# Patient Record
Sex: MALE | Race: BLACK OR AFRICAN AMERICAN | NOT HISPANIC OR LATINO | Employment: UNEMPLOYED | URBAN - METROPOLITAN AREA
[De-identification: names, ages, dates, MRNs, and addresses within clinical notes are randomized per-mention and may not be internally consistent; named-entity substitution may affect disease eponyms.]

---

## 2023-01-25 PROBLEM — Z98.84 GASTRIC BYPASS STATUS FOR OBESITY: Status: ACTIVE | Noted: 2023-01-25

## 2023-01-25 PROBLEM — I42.9 CARDIOMYOPATHY (MULTI): Status: ACTIVE | Noted: 2023-01-25

## 2023-01-25 PROBLEM — E53.8 VITAMIN B12 DEFICIENCY: Status: ACTIVE | Noted: 2023-01-25

## 2023-01-25 PROBLEM — R06.09 DYSPNEA ON EXERTION: Status: ACTIVE | Noted: 2023-01-25

## 2023-01-25 PROBLEM — D75.89 MACROCYTOSIS: Status: ACTIVE | Noted: 2023-01-25

## 2023-01-25 PROBLEM — K63.8219 SMALL INTESTINAL BACTERIAL OVERGROWTH: Status: ACTIVE | Noted: 2023-01-25

## 2023-01-25 PROBLEM — B35.3 TINEA PEDIS OF RIGHT FOOT: Status: ACTIVE | Noted: 2023-01-25

## 2023-01-25 PROBLEM — M10.9 ACUTE GOUT: Status: ACTIVE | Noted: 2023-01-25

## 2023-01-25 PROBLEM — E79.0 HYPERURICEMIA: Status: ACTIVE | Noted: 2023-01-25

## 2023-01-25 PROBLEM — L98.9 SKIN LESION OF FOOT: Status: ACTIVE | Noted: 2023-01-25

## 2023-01-25 PROBLEM — R14.0 ABDOMINAL BLOATING: Status: ACTIVE | Noted: 2023-01-25

## 2023-01-25 RX ORDER — SPIRONOLACTONE 25 MG/1
0.5 TABLET ORAL DAILY
COMMUNITY
Start: 2019-11-12 | End: 2023-03-09 | Stop reason: SDUPTHER

## 2023-01-25 RX ORDER — FUROSEMIDE 40 MG/1
0.5 TABLET ORAL DAILY
COMMUNITY
Start: 2019-11-12

## 2023-01-25 RX ORDER — COLCHICINE 0.6 MG/1
TABLET ORAL
COMMUNITY
Start: 2022-09-07

## 2023-01-25 RX ORDER — SACUBITRIL AND VALSARTAN 24; 26 MG/1; MG/1
1 TABLET, FILM COATED ORAL 2 TIMES DAILY
COMMUNITY
Start: 2021-04-20

## 2023-01-25 RX ORDER — CARVEDILOL 12.5 MG/1
1 TABLET ORAL
COMMUNITY
Start: 2019-11-12 | End: 2023-09-07 | Stop reason: ALTCHOICE

## 2023-01-25 RX ORDER — FOLIC ACID 1 MG/1
1 TABLET ORAL DAILY
COMMUNITY
Start: 2019-11-15 | End: 2023-03-09 | Stop reason: SDUPTHER

## 2023-01-25 RX ORDER — ALLOPURINOL 300 MG/1
1 TABLET ORAL DAILY
COMMUNITY
Start: 2022-09-10 | End: 2023-04-03 | Stop reason: SDUPTHER

## 2023-03-09 ENCOUNTER — OFFICE VISIT (OUTPATIENT)
Dept: PRIMARY CARE | Facility: CLINIC | Age: 58
End: 2023-03-09
Payer: MEDICARE

## 2023-03-09 VITALS
OXYGEN SATURATION: 96 % | WEIGHT: 234 LBS | DIASTOLIC BLOOD PRESSURE: 63 MMHG | HEART RATE: 77 BPM | TEMPERATURE: 96.2 F | HEIGHT: 61 IN | BODY MASS INDEX: 44.18 KG/M2 | SYSTOLIC BLOOD PRESSURE: 100 MMHG | RESPIRATION RATE: 17 BRPM

## 2023-03-09 DIAGNOSIS — K63.8219 SMALL INTESTINAL BACTERIAL OVERGROWTH: ICD-10-CM

## 2023-03-09 DIAGNOSIS — Z12.5 SCREENING PSA (PROSTATE SPECIFIC ANTIGEN): ICD-10-CM

## 2023-03-09 DIAGNOSIS — D75.89 MACROCYTOSIS: ICD-10-CM

## 2023-03-09 DIAGNOSIS — M10.9 GOUT, UNSPECIFIED CAUSE, UNSPECIFIED CHRONICITY, UNSPECIFIED SITE: ICD-10-CM

## 2023-03-09 DIAGNOSIS — R06.09 DYSPNEA ON EXERTION: ICD-10-CM

## 2023-03-09 DIAGNOSIS — I42.9 CARDIOMYOPATHY, UNSPECIFIED TYPE (MULTI): ICD-10-CM

## 2023-03-09 DIAGNOSIS — H53.8 BLURRY VISION: ICD-10-CM

## 2023-03-09 DIAGNOSIS — Z00.00 MEDICARE ANNUAL WELLNESS VISIT, SUBSEQUENT: Primary | ICD-10-CM

## 2023-03-09 PROBLEM — B35.3 TINEA PEDIS OF RIGHT FOOT: Status: RESOLVED | Noted: 2023-01-25 | Resolved: 2023-03-09

## 2023-03-09 PROBLEM — L98.9 SKIN LESION OF FOOT: Status: RESOLVED | Noted: 2023-01-25 | Resolved: 2023-03-09

## 2023-03-09 PROBLEM — I10 HYPERTENSION: Status: ACTIVE | Noted: 2023-03-09

## 2023-03-09 PROCEDURE — 99213 OFFICE O/P EST LOW 20 MIN: CPT | Performed by: PEDIATRICS

## 2023-03-09 PROCEDURE — 3074F SYST BP LT 130 MM HG: CPT | Performed by: PEDIATRICS

## 2023-03-09 PROCEDURE — 3078F DIAST BP <80 MM HG: CPT | Performed by: PEDIATRICS

## 2023-03-09 PROCEDURE — 82607 VITAMIN B-12: CPT | Performed by: PEDIATRICS

## 2023-03-09 PROCEDURE — 84153 ASSAY OF PSA TOTAL: CPT | Performed by: PEDIATRICS

## 2023-03-09 PROCEDURE — G0439 PPPS, SUBSEQ VISIT: HCPCS | Performed by: PEDIATRICS

## 2023-03-09 PROCEDURE — 85025 COMPLETE CBC W/AUTO DIFF WBC: CPT | Performed by: PEDIATRICS

## 2023-03-09 RX ORDER — FOLIC ACID 1 MG/1
1 TABLET ORAL DAILY
Qty: 30 TABLET | Refills: 11 | Status: SHIPPED | OUTPATIENT
Start: 2023-03-09 | End: 2023-09-07 | Stop reason: SDUPTHER

## 2023-03-09 RX ORDER — CARVEDILOL 6.25 MG/1
6.25 TABLET ORAL
COMMUNITY
Start: 2022-07-01

## 2023-03-09 RX ORDER — SPIRONOLACTONE 25 MG/1
12.5 TABLET ORAL DAILY
Qty: 90 TABLET | Refills: 3 | Status: SHIPPED | OUTPATIENT
Start: 2023-03-09 | End: 2023-09-07 | Stop reason: SINTOL

## 2023-03-09 RX ORDER — INDOMETHACIN 50 MG/1
CAPSULE ORAL
COMMUNITY
Start: 2022-09-07 | End: 2024-03-06 | Stop reason: ALTCHOICE

## 2023-03-09 RX ORDER — NAPROXEN 500 MG/1
TABLET ORAL
COMMUNITY
Start: 2022-10-21

## 2023-03-09 ASSESSMENT — PATIENT HEALTH QUESTIONNAIRE - PHQ9
1. LITTLE INTEREST OR PLEASURE IN DOING THINGS: NOT AT ALL
2. FEELING DOWN, DEPRESSED OR HOPELESS: NOT AT ALL
SUM OF ALL RESPONSES TO PHQ9 QUESTIONS 1 AND 2: 0
SUM OF ALL RESPONSES TO PHQ9 QUESTIONS 1 AND 2: 0
1. LITTLE INTEREST OR PLEASURE IN DOING THINGS: NOT AT ALL
SUM OF ALL RESPONSES TO PHQ9 QUESTIONS 1 AND 2: 0
1. LITTLE INTEREST OR PLEASURE IN DOING THINGS: NOT AT ALL

## 2023-03-09 ASSESSMENT — ACTIVITIES OF DAILY LIVING (ADL)
DRESSING: INDEPENDENT
GROCERY_SHOPPING: INDEPENDENT
TAKING_MEDICATION: INDEPENDENT
DOING_HOUSEWORK: INDEPENDENT
BATHING: INDEPENDENT

## 2023-03-09 ASSESSMENT — PAIN SCALES - GENERAL: PAINLEVEL: 0-NO PAIN

## 2023-03-09 NOTE — PROGRESS NOTES
"Subjective   Patient ID: Olaf Gage is a 58 y.o. male who presents for Blood Pressure Check (Pt. Is here for BP Check follow up.).    HPI   Patient has been feeling well. No new complaints except some blurry vision from time to time; just got over a cold  Review of Systems    Objective   /63 (BP Location: Left arm, Patient Position: Sitting, BP Cuff Size: Adult)   Pulse 77   Temp 35.7 °C (96.2 °F) (Temporal)   Resp 17   Ht 1.549 m (5' 1\")   Wt 106 kg (234 lb)   SpO2 96%   BMI 44.21 kg/m²     Physical Exam  Vitals reviewed.   Constitutional:       General: He is not in acute distress.  HENT:      Head: Normocephalic.      Right Ear: Tympanic membrane normal.      Left Ear: Tympanic membrane normal.      Nose: Nose normal.      Mouth/Throat:      Pharynx: Oropharynx is clear.   Cardiovascular:      Rate and Rhythm: Normal rate and regular rhythm.      Heart sounds: Normal heart sounds.   Pulmonary:      Breath sounds: Wheezing present.      Comments: Occasional wheeze   Abdominal:      Palpations: Abdomen is soft.      Tenderness: There is no abdominal tenderness.   Musculoskeletal:         General: No tenderness.      Cervical back: Neck supple.   Skin:     Findings: No rash.   Neurological:      General: No focal deficit present.      Mental Status: He is alert.   Psychiatric:         Mood and Affect: Mood normal.       Problem List Items Addressed This Visit          Respiratory    Dyspnea on exertion - Primary     Is improved; able to use treadmill            Circulatory    Cardiomyopathy (CMS/HCC)     Patient is under the care of Dr Martinez         Relevant Medications    carvedilol (Coreg) 6.25 mg tablet       Digestive    Small intestinal bacterial overgrowth     Needed antibiotics last fall but has had no recurrence            Assessment/Plan   Problem List Items Addressed This Visit          Respiratory    Dyspnea on exertion - Primary     Is improved; able to use treadmill            " Circulatory    Cardiomyopathy (CMS/HCC)     Patient is under the care of Dr Martinez         Relevant Medications    carvedilol (Coreg) 6.25 mg tablet       Digestive    Small intestinal bacterial overgrowth     Needed antibiotics last fall but has had no recurrence

## 2023-03-13 DIAGNOSIS — I42.9 CARDIOMYOPATHY, UNSPECIFIED TYPE (MULTI): ICD-10-CM

## 2023-03-13 DIAGNOSIS — D75.89 MACROCYTOSIS: Primary | ICD-10-CM

## 2023-04-03 DIAGNOSIS — E79.0 HYPERURICEMIA: Primary | ICD-10-CM

## 2023-04-04 RX ORDER — ALLOPURINOL 300 MG/1
300 TABLET ORAL DAILY
Qty: 90 TABLET | Refills: 1 | Status: SHIPPED | OUTPATIENT
Start: 2023-04-04 | End: 2023-09-21

## 2023-04-18 LAB
BASOPHILS (10*3/UL) IN BLOOD BY AUTOMATED COUNT: 0.02 X10E9/L (ref 0–0.1)
BASOPHILS/100 LEUKOCYTES IN BLOOD BY AUTOMATED COUNT: 0.3 % (ref 0–2)
EOSINOPHILS (10*3/UL) IN BLOOD BY AUTOMATED COUNT: 0.21 X10E9/L (ref 0–0.7)
EOSINOPHILS/100 LEUKOCYTES IN BLOOD BY AUTOMATED COUNT: 3.1 % (ref 0–6)
ERYTHROCYTE DISTRIBUTION WIDTH (RATIO) BY AUTOMATED COUNT: 13.2 % (ref 11.5–14.5)
ERYTHROCYTE MEAN CORPUSCULAR HEMOGLOBIN CONCENTRATION (G/DL) BY AUTOMATED: 33 G/DL (ref 32–36)
ERYTHROCYTE MEAN CORPUSCULAR VOLUME (FL) BY AUTOMATED COUNT: 103 FL (ref 80–100)
ERYTHROCYTES (10*6/UL) IN BLOOD BY AUTOMATED COUNT: 4.04 X10E12/L (ref 4.5–5.9)
HEMATOCRIT (%) IN BLOOD BY AUTOMATED COUNT: 41.8 % (ref 41–52)
HEMOGLOBIN (G/DL) IN BLOOD: 13.8 G/DL (ref 13.5–17.5)
IMMATURE GRANULOCYTES/100 LEUKOCYTES IN BLOOD BY AUTOMATED COUNT: 0.1 % (ref 0–0.9)
LEUKOCYTES (10*3/UL) IN BLOOD BY AUTOMATED COUNT: 6.7 X10E9/L (ref 4.4–11.3)
LYMPHOCYTES (10*3/UL) IN BLOOD BY AUTOMATED COUNT: 2.88 X10E9/L (ref 1.2–4.8)
LYMPHOCYTES/100 LEUKOCYTES IN BLOOD BY AUTOMATED COUNT: 43.1 % (ref 13–44)
MONOCYTES (10*3/UL) IN BLOOD BY AUTOMATED COUNT: 0.54 X10E9/L (ref 0.1–1)
MONOCYTES/100 LEUKOCYTES IN BLOOD BY AUTOMATED COUNT: 8.1 % (ref 2–10)
NEUTROPHILS (10*3/UL) IN BLOOD BY AUTOMATED COUNT: 3.02 X10E9/L (ref 1.2–7.7)
NEUTROPHILS/100 LEUKOCYTES IN BLOOD BY AUTOMATED COUNT: 45.3 % (ref 40–80)
NRBC (PER 100 WBCS) BY AUTOMATED COUNT: 0 /100 WBC (ref 0–0)
PLATELETS (10*3/UL) IN BLOOD AUTOMATED COUNT: 234 X10E9/L (ref 150–450)
THYROTROPIN (MIU/L) IN SER/PLAS BY DETECTION LIMIT <= 0.05 MIU/L: 1.07 MIU/L (ref 0.44–3.98)

## 2023-04-22 DIAGNOSIS — D75.89 MACROCYTOSIS: Primary | ICD-10-CM

## 2023-04-22 NOTE — RESULT ENCOUNTER NOTE
Repeat complete blood count in 3 months and if the MCV is still high, we will refer to hematology specialist. Continue avoiding alcohol as you have done for a month.

## 2023-05-08 ENCOUNTER — TELEPHONE (OUTPATIENT)
Dept: PRIMARY CARE | Facility: CLINIC | Age: 58
End: 2023-05-08
Payer: MEDICARE

## 2023-09-07 ENCOUNTER — OFFICE VISIT (OUTPATIENT)
Dept: PRIMARY CARE | Facility: CLINIC | Age: 58
End: 2023-09-07
Payer: MEDICARE

## 2023-09-07 VITALS
SYSTOLIC BLOOD PRESSURE: 73 MMHG | HEART RATE: 71 BPM | BODY MASS INDEX: 32.99 KG/M2 | OXYGEN SATURATION: 97 % | HEIGHT: 70 IN | TEMPERATURE: 96.8 F | DIASTOLIC BLOOD PRESSURE: 42 MMHG | WEIGHT: 230.4 LBS

## 2023-09-07 DIAGNOSIS — I42.9 CARDIOMYOPATHY, UNSPECIFIED TYPE (MULTI): Primary | ICD-10-CM

## 2023-09-07 DIAGNOSIS — I95.2 HYPOTENSION DUE TO DRUGS: ICD-10-CM

## 2023-09-07 DIAGNOSIS — R14.0 ABDOMINAL BLOATING: ICD-10-CM

## 2023-09-07 DIAGNOSIS — Z98.84 GASTRIC BYPASS STATUS FOR OBESITY: ICD-10-CM

## 2023-09-07 DIAGNOSIS — D75.89 MACROCYTOSIS: ICD-10-CM

## 2023-09-07 DIAGNOSIS — I10 HYPERTENSION, UNSPECIFIED TYPE: ICD-10-CM

## 2023-09-07 DIAGNOSIS — E53.8 VITAMIN B12 DEFICIENCY: ICD-10-CM

## 2023-09-07 DIAGNOSIS — M10.9 ACUTE GOUT INVOLVING TOE, UNSPECIFIED CAUSE, UNSPECIFIED LATERALITY: ICD-10-CM

## 2023-09-07 DIAGNOSIS — E79.0 HYPERURICEMIA: ICD-10-CM

## 2023-09-07 DIAGNOSIS — R06.09 DYSPNEA ON EXERTION: ICD-10-CM

## 2023-09-07 PROBLEM — B35.3 TINEA PEDIS OF RIGHT FOOT: Status: ACTIVE | Noted: 2023-09-07

## 2023-09-07 PROBLEM — I95.9 HYPOTENSION: Status: ACTIVE | Noted: 2023-09-07

## 2023-09-07 PROBLEM — L98.9 SKIN LESION OF FOOT: Status: ACTIVE | Noted: 2023-09-07

## 2023-09-07 PROCEDURE — G0439 PPPS, SUBSEQ VISIT: HCPCS | Performed by: PEDIATRICS

## 2023-09-07 PROCEDURE — 3078F DIAST BP <80 MM HG: CPT | Performed by: PEDIATRICS

## 2023-09-07 PROCEDURE — 3074F SYST BP LT 130 MM HG: CPT | Performed by: PEDIATRICS

## 2023-09-07 PROCEDURE — 99213 OFFICE O/P EST LOW 20 MIN: CPT | Performed by: PEDIATRICS

## 2023-09-07 RX ORDER — AMOXICILLIN AND CLAVULANATE POTASSIUM 875; 125 MG/1; MG/1
1 TABLET, FILM COATED ORAL 2 TIMES DAILY
COMMUNITY
Start: 2023-05-16 | End: 2023-05-26

## 2023-09-07 RX ORDER — LANOLIN ALCOHOL/MO/W.PET/CERES
1000 CREAM (GRAM) TOPICAL DAILY
Qty: 30 TABLET | Refills: 11 | Status: SHIPPED | OUTPATIENT
Start: 2023-09-07 | End: 2024-09-06

## 2023-09-07 RX ORDER — FOLIC ACID 1 MG/1
1 TABLET ORAL DAILY
Qty: 30 TABLET | Refills: 11 | Status: SHIPPED | OUTPATIENT
Start: 2023-09-07

## 2023-09-07 RX ORDER — ACETAMINOPHEN 500 MG
TABLET ORAL
Qty: 1 KIT | Refills: 0 | Status: SHIPPED | OUTPATIENT
Start: 2023-09-07 | End: 2024-03-06

## 2023-09-07 ASSESSMENT — ACTIVITIES OF DAILY LIVING (ADL)
GROCERY_SHOPPING: INDEPENDENT
BATHING: INDEPENDENT
TAKING_MEDICATION: NEEDS ASSISTANCE
MANAGING_FINANCES: INDEPENDENT
DRESSING: INDEPENDENT
DOING_HOUSEWORK: INDEPENDENT

## 2023-09-07 ASSESSMENT — PATIENT HEALTH QUESTIONNAIRE - PHQ9
2. FEELING DOWN, DEPRESSED OR HOPELESS: NOT AT ALL
1. LITTLE INTEREST OR PLEASURE IN DOING THINGS: NOT AT ALL
SUM OF ALL RESPONSES TO PHQ9 QUESTIONS 1 AND 2: 0

## 2023-09-07 NOTE — PROGRESS NOTES
"Subjective   Patient ID: Olaf Gage is a 58 y.o. male who presents for medicare wellness visit    HPI     Patient has noted intermittent dizziness in the last 4 to 6 months when he bends down to tie his shoes; does not check BP at home  PSA normal; cologuard due    Review of Systems    Objective   BP (!) 81/49 (BP Location: Left arm, Patient Position: Sitting)   Pulse 65   Temp 36 °C (96.8 °F)   Ht 1.778 m (5' 10\")   Wt 105 kg (230 lb 6.4 oz)   SpO2 97%   BMI 33.06 kg/m²   BP 73/42 on repeat   Physical Exam  HEENT neg  Lungs clear  Heart reg  Abd soft  Assessment/Plan   Problem List Items Addressed This Visit       Abdominal bloating    Cardiomyopathy (CMS/HCC) - Primary     Dr. Devan Martinez - Cardiologist - Appointment next Wednesday 09/13/2023         Dyspnea on exertion    Gastric bypass status for obesity    Hyperuricemia    Macrocytosis    Relevant Medications    folic acid (Folvite) 1 mg tablet    Vitamin B12 deficiency     B12 supplement daily.          Relevant Medications    cyanocobalamin (Vitamin B-12) 1,000 mcg tablet    Hypertension    Acute gout     Rare flare ups, utilizes colchicine if flare up occurs.          Hypotension     Call placed to Dr Martinez; does not check BP at home; he said to drop Spironolactone; will see dr Martinez next week         Relevant Medications    blood pressure monitor kit     BP dropped to 72/49 briefly; after 911 was called it emmanuel back up to 80s systolic.     "

## 2023-09-07 NOTE — ASSESSMENT & PLAN NOTE
Call placed to Dr Martinez; does not check BP at home; he said to drop Spironolactone; will see dr Martinez next week  Due to BP becoming lower, 911 is called

## 2023-09-21 DIAGNOSIS — E79.0 HYPERURICEMIA: ICD-10-CM

## 2023-09-21 RX ORDER — ALLOPURINOL 300 MG/1
300 TABLET ORAL DAILY
Qty: 90 TABLET | Refills: 1 | Status: SHIPPED | OUTPATIENT
Start: 2023-09-21 | End: 2024-03-23

## 2023-09-27 ENCOUNTER — TELEPHONE (OUTPATIENT)
Dept: PRIMARY CARE | Facility: CLINIC | Age: 58
End: 2023-09-27
Payer: MEDICARE

## 2023-09-27 NOTE — TELEPHONE ENCOUNTER
Pt. Left a voicemail message with his first and last name and mobile  phone number did not leave a detailed message. I called Pt. Back on the number listed unable to leave a voicemail message, voicemail box is full.

## 2023-11-06 ENCOUNTER — TELEPHONE (OUTPATIENT)
Dept: PRIMARY CARE | Facility: CLINIC | Age: 58
End: 2023-11-06

## 2023-12-04 ENCOUNTER — TELEPHONE (OUTPATIENT)
Dept: PRIMARY CARE | Facility: CLINIC | Age: 58
End: 2023-12-04
Payer: MEDICARE

## 2023-12-06 ENCOUNTER — OFFICE VISIT (OUTPATIENT)
Dept: PRIMARY CARE | Facility: CLINIC | Age: 58
End: 2023-12-06
Payer: MEDICARE

## 2023-12-06 VITALS
BODY MASS INDEX: 34.01 KG/M2 | TEMPERATURE: 96.9 F | SYSTOLIC BLOOD PRESSURE: 95 MMHG | WEIGHT: 237 LBS | DIASTOLIC BLOOD PRESSURE: 61 MMHG | HEART RATE: 60 BPM

## 2023-12-06 DIAGNOSIS — I42.9 CARDIOMYOPATHY, UNSPECIFIED TYPE (MULTI): ICD-10-CM

## 2023-12-06 DIAGNOSIS — K04.7 DENTAL INFECTION: Primary | ICD-10-CM

## 2023-12-06 DIAGNOSIS — E53.8 VITAMIN B12 DEFICIENCY: ICD-10-CM

## 2023-12-06 DIAGNOSIS — E79.0 HYPERURICEMIA: ICD-10-CM

## 2023-12-06 DIAGNOSIS — D75.89 MACROCYTOSIS: ICD-10-CM

## 2023-12-06 PROBLEM — M10.9 ACUTE GOUT: Status: RESOLVED | Noted: 2023-09-07 | Resolved: 2023-12-06

## 2023-12-06 PROBLEM — R14.0 ABDOMINAL BLOATING: Status: RESOLVED | Noted: 2023-01-25 | Resolved: 2023-12-06

## 2023-12-06 PROBLEM — K63.8219 SMALL INTESTINAL BACTERIAL OVERGROWTH: Status: RESOLVED | Noted: 2023-01-25 | Resolved: 2023-12-06

## 2023-12-06 PROCEDURE — 3074F SYST BP LT 130 MM HG: CPT | Performed by: PEDIATRICS

## 2023-12-06 PROCEDURE — 90686 IIV4 VACC NO PRSV 0.5 ML IM: CPT | Performed by: PEDIATRICS

## 2023-12-06 PROCEDURE — 99213 OFFICE O/P EST LOW 20 MIN: CPT | Performed by: PEDIATRICS

## 2023-12-06 PROCEDURE — G0008 ADMIN INFLUENZA VIRUS VAC: HCPCS | Performed by: PEDIATRICS

## 2023-12-06 PROCEDURE — 3078F DIAST BP <80 MM HG: CPT | Performed by: PEDIATRICS

## 2023-12-06 RX ORDER — AMOXICILLIN 875 MG/1
875 TABLET, FILM COATED ORAL 2 TIMES DAILY
Qty: 20 TABLET | Refills: 0 | Status: SHIPPED | OUTPATIENT
Start: 2023-12-06 | End: 2023-12-16

## 2023-12-06 NOTE — PROGRESS NOTES
Subjective   Patient ID: Olaf Gage is a 58 y.o. male who presents for pain left jaw    HPI     Headaches, left side jaw pain as well as teeth pain, states teeth are sensitive with cold water. Intermittent sharp pains. Starts at TMJ and goes up left side of head. No numbness, tingling or weakness. Has been ongoing for over 1 month. Was seen in ED last month, CT brain negative but some thickening of ethmoid cells and treated as sinusitis with Augmentin which did relieve symptoms for 1 week.   Has some runny nose, mild cough and left ear fullness but no congestion, post-nasal drip.   Has appt with dentist 12/14        Review of Systems    Objective   BP 95/61   Pulse 60   Temp 36.1 °C (96.9 °F)   Wt 108 kg (237 lb)   BMI 34.01 kg/m²     Physical Exam  Left jaw tender  Tender left mandible  No swollen glands  Assessment/Plan   Problem List Items Addressed This Visit             ICD-10-CM    Cardiomyopathy (CMS/HCC) I42.9     Follows with cardiology         Hyperuricemia E79.0     No attacks with allopurinol         Macrocytosis D75.89     CBC in may showed MCV of 101         Vitamin B12 deficiency E53.8     Cont supplementation         Dental infection - Primary K04.7    Relevant Medications    amoxicillin (Amoxil) 875 mg tablet

## 2023-12-15 ENCOUNTER — TELEPHONE (OUTPATIENT)
Dept: PRIMARY CARE | Facility: CLINIC | Age: 58
End: 2023-12-15
Payer: MEDICARE

## 2024-01-12 ENCOUNTER — TELEPHONE (OUTPATIENT)
Dept: PRIMARY CARE | Facility: CLINIC | Age: 59
End: 2024-01-12
Payer: MEDICARE

## 2024-01-12 NOTE — TELEPHONE ENCOUNTER
Dr. Rosas, Pt left a voicemail message saying his jaw is still hurting he's requesting more amoxicillin. If with questions please contact Pt. at [phone].

## 2024-01-25 ENCOUNTER — TELEPHONE (OUTPATIENT)
Dept: CARDIOLOGY | Facility: CLINIC | Age: 59
End: 2024-01-25
Payer: MEDICARE

## 2024-01-26 ENCOUNTER — TELEPHONE (OUTPATIENT)
Dept: PRIMARY CARE | Facility: CLINIC | Age: 59
End: 2024-01-26
Payer: MEDICARE

## 2024-01-26 NOTE — TELEPHONE ENCOUNTER
, Pt. Left a voicemail message he would like for you to call him back at [phone]. Pt. Did not leave no other details.

## 2024-03-06 ENCOUNTER — OFFICE VISIT (OUTPATIENT)
Dept: CARDIOLOGY | Facility: CLINIC | Age: 59
End: 2024-03-06
Payer: MEDICARE

## 2024-03-06 VITALS
WEIGHT: 232.5 LBS | OXYGEN SATURATION: 98 % | BODY MASS INDEX: 32.55 KG/M2 | HEIGHT: 71 IN | DIASTOLIC BLOOD PRESSURE: 73 MMHG | SYSTOLIC BLOOD PRESSURE: 111 MMHG | HEART RATE: 71 BPM

## 2024-03-06 DIAGNOSIS — I50.9 CONGESTIVE HEART FAILURE, UNSPECIFIED HF CHRONICITY, UNSPECIFIED HEART FAILURE TYPE (MULTI): Primary | ICD-10-CM

## 2024-03-06 DIAGNOSIS — R94.31 ABNORMAL ELECTROCARDIOGRAM (ECG) (EKG): ICD-10-CM

## 2024-03-06 PROCEDURE — 99214 OFFICE O/P EST MOD 30 MIN: CPT | Performed by: NURSE PRACTITIONER

## 2024-03-06 PROCEDURE — 3078F DIAST BP <80 MM HG: CPT | Performed by: NURSE PRACTITIONER

## 2024-03-06 PROCEDURE — 3074F SYST BP LT 130 MM HG: CPT | Performed by: NURSE PRACTITIONER

## 2024-03-06 RX ORDER — TIZANIDINE 4 MG/1
1 TABLET ORAL NIGHTLY
COMMUNITY
Start: 2024-02-08 | End: 2024-03-09

## 2024-03-06 RX ORDER — NAPROXEN SODIUM 220 MG/1
81 TABLET, FILM COATED ORAL
COMMUNITY
Start: 2012-04-20

## 2024-03-06 ASSESSMENT — ENCOUNTER SYMPTOMS
MUSCULOSKELETAL NEGATIVE: 1
CARDIOVASCULAR NEGATIVE: 1
LOSS OF SENSATION IN FEET: 0
NEUROLOGICAL NEGATIVE: 1
DEPRESSION: 0
RESPIRATORY NEGATIVE: 1
GASTROINTESTINAL NEGATIVE: 1
OCCASIONAL FEELINGS OF UNSTEADINESS: 0
CONSTITUTIONAL NEGATIVE: 1

## 2024-03-06 ASSESSMENT — PAIN SCALES - GENERAL: PAINLEVEL: 0-NO PAIN

## 2024-03-06 NOTE — PROGRESS NOTES
"Chief Complaint:   Follow-up    History Of Present Illness:    .Mr Gage returns in follow up.  Denies chest pain, sob, palpitations or pedal edema.  Unfortunately he has a toothache and is working with dental.         Last Recorded Vitals:  Blood pressure 111/73, pulse 71, height 1.803 m (5' 11\"), weight 105 kg (232 lb 8 oz), SpO2 98 %.     Past Medical History:  Past Medical History:   Diagnosis Date    Encounter for immunization 03/08/2022    Encounter for vaccination    Left lower quadrant pain 09/24/2020    Colicky LLQ abdominal pain    Unspecified abdominal pain 03/04/2020    Abdominal pain of multiple sites        Past Surgical History:  History reviewed. No pertinent surgical history.    Social History:  Social History     Socioeconomic History    Marital status:      Spouse name: None    Number of children: None    Years of education: None    Highest education level: None   Occupational History    None   Tobacco Use    Smoking status: Some Days     Types: Cigarettes, Cigars    Smokeless tobacco: Never   Substance and Sexual Activity    Alcohol use: Yes     Alcohol/week: 6.0 standard drinks of alcohol     Types: 6 Glasses of wine per week    Drug use: Never    Sexual activity: None   Other Topics Concern    None   Social History Narrative    None     Social Determinants of Health     Financial Resource Strain: Not on file   Food Insecurity: Not on file   Transportation Needs: Not on file   Physical Activity: Not on file   Stress: Not on file   Social Connections: Not on file   Intimate Partner Violence: Not on file   Housing Stability: Not on file       Family History:  No family history on file.      Allergies:  Patient has no known allergies.    Outpatient Medications:  Current Outpatient Medications   Medication Sig Dispense Refill    allopurinol (Zyloprim) 300 mg tablet TAKE ONE TABLET BY MOUTH EVERY DAY 90 tablet 1    aspirin 81 mg chewable tablet Chew 1 tablet (81 mg) once daily.      " carvedilol (Coreg) 6.25 mg tablet Take 1 tablet (6.25 mg) by mouth 2 times a day with meals.      colchicine 0.6 mg tablet TAKE 2 TABLETS BY MOUTH THEN 1 TABLET IN 1 HOUR. THEN TAKE 1 TABLET DAILY      cyanocobalamin (Vitamin B-12) 1,000 mcg tablet Take 1 tablet (1,000 mcg) by mouth once daily. 30 tablet 11    folic acid (Folvite) 1 mg tablet Take 1 tablet (1 mg) by mouth once daily. 30 tablet 11    furosemide (Lasix) 40 mg tablet 0.5 tablets (20 mg) once daily.      naproxen (Naprosyn) 500 mg tablet Take 1 tablet by mouth twice daily as needed for pain for up to 14 days. Take with food.      sacubitriL-valsartan (Entresto) 24-26 mg tablet Take 1 tablet by mouth 2 times a day.      tiZANidine (Zanaflex) 4 mg tablet Take 1 tablet (4 mg) by mouth once daily at bedtime. PRN       No current facility-administered medications for this visit.        Physical Exam:  Cardiovascular:      PMI at left midclavicular line. Normal rate. Regular rhythm. Normal S1. Normal S2.       Murmurs: There is no murmur.      No gallop.  No click. No rub.   Pulses:     Intact distal pulses.   Edema:     Peripheral edema absent.         ROS:  Review of Systems   Constitutional: Negative.   Cardiovascular: Negative.    Respiratory: Negative.     Skin: Negative.    Musculoskeletal: Negative.    Gastrointestinal: Negative.    Genitourinary: Negative.    Neurological: Negative.           Last Labs:  CBC -  Lab Results   Component Value Date    WBC 6.7 04/18/2023    HGB 13.8 04/18/2023    HCT 41.8 04/18/2023     (H) 04/18/2023     04/18/2023       CMP -  Lab Results   Component Value Date    CALCIUM 8.6 09/09/2022    ALT 13 10/10/2022       LIPID PANEL -   Lab Results   Component Value Date    CHOL 148 09/09/2022    TRIG 164 (H) 09/09/2022    HDL 59.2 09/09/2022    CHHDL 2.5 09/09/2022    LDLF 56 09/09/2022    VLDL 33 09/09/2022       RENAL FUNCTION PANEL -   Lab Results   Component Value Date    GLUCOSE 98 09/09/2022     (L)  "09/09/2022    K 4.0 09/09/2022    CL 94 (L) 09/09/2022    CO2 30 09/09/2022    ANIONGAP 12 09/09/2022    BUN 7 09/09/2022    CREATININE 0.71 09/09/2022    GFRMALE >90 09/09/2022    CALCIUM 8.6 09/09/2022        No results found for: \"BNP\", \"HGBA1C\"      Assessment/Plan   Problem List Items Addressed This Visit    None      Assessment:     1. Nonischemic congestive cardiomyopathy. This patient was originally admitted to [facility] from 4/13/2012â€“4/17/2012 with new onset congestive heart failure. He was transferred to [facility] on 4/17/2012 to undergo right and left heart catheterization. This demonstrated normal coronary arteries with a severe dilated cardiomyopathy and an estimated LV ejection fraction in the range of only 10%. The patient was scheduled to have outpatient follow-up in order to determine whether he would be a candidate for ICD implantation but ultimately he has been very inconsistent with follow-up. The patient has actually done rather well despite the absence of ongoing follow-up. It should be noted that the patient did have a history of morbid obesity and actually weighed 435 pounds before undergoing gastric bypass in 2003. He was able to decrease his weight to 315 pounds as a result of this procedure. He began a more aggressive diet approximately 10 years ago and has lost another  pounds currently weighing 224 pounds. The etiology of his cardiomyopathy at that point may have been related to obesity and hypertension. His blood pressure is in the lower range of normal. He presently is on low-dose Entresto 24/26 mg twice daily spironolactone 25 mg daily and carvedilol s 12.5 mg daily. The patient did have a repeat echocardiogram performed on 1/19/2021 which demonstrates a moderately severe reduction in the LV ejection fraction at 30-35% with global hypokinesis. Given the low blood pressure will reduce the spironolactone from 25 mg daily to 12.5 mg daily. He is now off " spironolactone due to hypotension. Return to office in 6 months. The patient states he feels improved since switching from the lisinopril to the Entresto. Echo done 09/2022 showed EF 40-45%.     2. Obesity, status post gastric bypass 2003. As noted above the patient did weigh an maximum of 435 pounds before having a gastric bypass in 2003. This resulted in a weight loss to 315 pounds. He subsequently pursued diet and exercise program and has lost another 90 pounds currently weighing 232 pounds.     3. Type 2 diabetes. The patient previously had been diagnosed as being type II diabetic but this resolved with his weight loss.     4. Status post colonoscopic polypectomy for hyperplastic polyp 2013.     5. Miscellaneous. The patient is a cigar smoker drinks 1 glass of wine per day.     Heidi Gibson, APRN-CNP

## 2024-03-07 ENCOUNTER — OFFICE VISIT (OUTPATIENT)
Dept: PRIMARY CARE | Facility: CLINIC | Age: 59
End: 2024-03-07
Payer: MEDICARE

## 2024-03-07 VITALS
WEIGHT: 231 LBS | DIASTOLIC BLOOD PRESSURE: 69 MMHG | HEIGHT: 71 IN | BODY MASS INDEX: 32.34 KG/M2 | SYSTOLIC BLOOD PRESSURE: 106 MMHG | RESPIRATION RATE: 17 BRPM | OXYGEN SATURATION: 97 % | HEART RATE: 68 BPM | TEMPERATURE: 96.2 F

## 2024-03-07 DIAGNOSIS — K05.10 GINGIVITIS: Primary | ICD-10-CM

## 2024-03-07 DIAGNOSIS — E79.0 HYPERURICEMIA: ICD-10-CM

## 2024-03-07 DIAGNOSIS — I10 HYPERTENSION, UNSPECIFIED TYPE: ICD-10-CM

## 2024-03-07 DIAGNOSIS — K04.7 DENTAL INFECTION: ICD-10-CM

## 2024-03-07 DIAGNOSIS — E66.09 CLASS 1 OBESITY DUE TO EXCESS CALORIES WITH SERIOUS COMORBIDITY AND BODY MASS INDEX (BMI) OF 32.0 TO 32.9 IN ADULT: ICD-10-CM

## 2024-03-07 DIAGNOSIS — D75.89 MACROCYTOSIS: ICD-10-CM

## 2024-03-07 DIAGNOSIS — Z12.5 SCREENING PSA (PROSTATE SPECIFIC ANTIGEN): ICD-10-CM

## 2024-03-07 PROBLEM — E66.811 CLASS 1 OBESITY DUE TO EXCESS CALORIES WITH SERIOUS COMORBIDITY AND BODY MASS INDEX (BMI) OF 32.0 TO 32.9 IN ADULT: Status: ACTIVE | Noted: 2024-03-07

## 2024-03-07 PROCEDURE — 3008F BODY MASS INDEX DOCD: CPT | Performed by: PEDIATRICS

## 2024-03-07 PROCEDURE — 99213 OFFICE O/P EST LOW 20 MIN: CPT | Performed by: PEDIATRICS

## 2024-03-07 PROCEDURE — 3078F DIAST BP <80 MM HG: CPT | Performed by: PEDIATRICS

## 2024-03-07 PROCEDURE — 3074F SYST BP LT 130 MM HG: CPT | Performed by: PEDIATRICS

## 2024-03-07 RX ORDER — AMOXICILLIN 875 MG/1
875 TABLET, FILM COATED ORAL 2 TIMES DAILY
Qty: 14 TABLET | Refills: 0 | Status: SHIPPED | OUTPATIENT
Start: 2024-03-07 | End: 2024-03-14

## 2024-03-07 RX ORDER — AMOXICILLIN 875 MG/1
875 TABLET, FILM COATED ORAL 2 TIMES DAILY
Qty: 14 TABLET | Refills: 0 | Status: SHIPPED | OUTPATIENT
Start: 2024-03-07 | End: 2024-03-07 | Stop reason: SDUPTHER

## 2024-03-07 ASSESSMENT — PAIN SCALES - GENERAL: PAINLEVEL: 10-WORST PAIN EVER

## 2024-03-07 NOTE — PROGRESS NOTES
"Subjective   Patient ID: Olaf Gage is a 59 y.o. male who presents for Hypertension.    HPI   Has chronic left jaw pain; will be a TMJ specialist; Muscle relaxer did not work; Hurts to eat;   No CP or SOB;  Colonoscopy due 2027  Review of Systems    Objective   /69 (BP Location: Right arm, Patient Position: Sitting, BP Cuff Size: Adult)   Pulse 68   Temp 35.7 °C (96.2 °F) (Temporal)   Resp 17   Ht 1.803 m (5' 11\")   Wt 105 kg (231 lb)   SpO2 97%   BMI 32.22 kg/m²     Physical Exam  Gums back of the jaw are tender;   Lungs clear  Heart RRR  Abd soft  Assessment/Plan   Problem List Items Addressed This Visit             ICD-10-CM    Macrocytosis D75.89    Hypertension I10    Dental infection K04.7    Class 1 obesity due to excess calories with serious comorbidity and body mass index (BMI) of 32.0 to 32.9 in adult E66.09, Z68.32     Other Visit Diagnoses         Codes    Gingivitis    -  Primary K05.10    Relevant Medications    amoxicillin (Amoxil) 875 mg tablet               "

## 2024-03-08 DIAGNOSIS — Z00.00 HEALTHCARE MAINTENANCE: Primary | ICD-10-CM

## 2024-03-08 PROBLEM — I95.9 HYPOTENSION: Status: RESOLVED | Noted: 2023-09-07 | Resolved: 2024-03-08

## 2024-03-08 PROBLEM — K63.8219 SMALL INTESTINAL BACTERIAL OVERGROWTH (SIBO): Status: ACTIVE | Noted: 2024-03-08

## 2024-03-22 DIAGNOSIS — E79.0 HYPERURICEMIA: ICD-10-CM

## 2024-03-23 RX ORDER — ALLOPURINOL 300 MG/1
300 TABLET ORAL DAILY
Qty: 90 TABLET | Refills: 1 | Status: SHIPPED | OUTPATIENT
Start: 2024-03-23

## 2024-06-18 ENCOUNTER — TELEPHONE (OUTPATIENT)
Dept: PRIMARY CARE | Facility: CLINIC | Age: 59
End: 2024-06-18
Payer: MEDICARE

## 2024-06-18 NOTE — TELEPHONE ENCOUNTER
Dr. Rosas Pt. left a voicemail message saying he need to talk to you and to call him back at [phone] no other information was left.

## 2024-06-19 ENCOUNTER — TELEPHONE (OUTPATIENT)
Dept: PRIMARY CARE | Facility: CLINIC | Age: 59
End: 2024-06-19
Payer: MEDICARE

## 2024-07-12 ENCOUNTER — APPOINTMENT (OUTPATIENT)
Dept: PRIMARY CARE | Facility: CLINIC | Age: 59
End: 2024-07-12
Payer: MEDICARE

## 2024-08-02 DIAGNOSIS — I42.9 CARDIOMYOPATHY, UNSPECIFIED TYPE (MULTI): Primary | ICD-10-CM

## 2024-08-02 RX ORDER — SACUBITRIL AND VALSARTAN 24; 26 MG/1; MG/1
1 TABLET, FILM COATED ORAL 2 TIMES DAILY
Qty: 180 TABLET | Refills: 3 | Status: SHIPPED | OUTPATIENT
Start: 2024-08-02 | End: 2025-08-02

## 2024-08-23 DIAGNOSIS — E53.8 VITAMIN B12 DEFICIENCY: ICD-10-CM

## 2024-08-23 RX ORDER — LANOLIN ALCOHOL/MO/W.PET/CERES
1000 CREAM (GRAM) TOPICAL DAILY
Qty: 30 TABLET | Refills: 11 | Status: SHIPPED | OUTPATIENT
Start: 2024-08-23 | End: 2025-08-23

## 2024-09-04 ENCOUNTER — LAB (OUTPATIENT)
Dept: LAB | Facility: LAB | Age: 59
End: 2024-09-04
Payer: MEDICARE

## 2024-09-04 ENCOUNTER — OFFICE VISIT (OUTPATIENT)
Dept: CARDIOLOGY | Facility: CLINIC | Age: 59
End: 2024-09-04
Payer: MEDICARE

## 2024-09-04 ENCOUNTER — HOSPITAL ENCOUNTER (OUTPATIENT)
Dept: CARDIOLOGY | Facility: CLINIC | Age: 59
Discharge: HOME | End: 2024-09-04
Payer: MEDICARE

## 2024-09-04 VITALS
SYSTOLIC BLOOD PRESSURE: 93 MMHG | BODY MASS INDEX: 31.19 KG/M2 | DIASTOLIC BLOOD PRESSURE: 65 MMHG | HEART RATE: 71 BPM | HEIGHT: 71 IN | WEIGHT: 222.8 LBS | OXYGEN SATURATION: 96 %

## 2024-09-04 DIAGNOSIS — D75.89 MACROCYTOSIS: ICD-10-CM

## 2024-09-04 DIAGNOSIS — R79.9 ABNORMAL FINDING OF BLOOD CHEMISTRY, UNSPECIFIED: ICD-10-CM

## 2024-09-04 DIAGNOSIS — Z00.00 HEALTHCARE MAINTENANCE: ICD-10-CM

## 2024-09-04 DIAGNOSIS — I50.9 CONGESTIVE HEART FAILURE, UNSPECIFIED HF CHRONICITY, UNSPECIFIED HEART FAILURE TYPE (MULTI): Primary | ICD-10-CM

## 2024-09-04 DIAGNOSIS — E66.09 CLASS 1 OBESITY DUE TO EXCESS CALORIES WITH SERIOUS COMORBIDITY AND BODY MASS INDEX (BMI) OF 32.0 TO 32.9 IN ADULT: ICD-10-CM

## 2024-09-04 DIAGNOSIS — I50.9 CONGESTIVE HEART FAILURE, UNSPECIFIED HF CHRONICITY, UNSPECIFIED HEART FAILURE TYPE (MULTI): ICD-10-CM

## 2024-09-04 DIAGNOSIS — R94.31 ABNORMAL ELECTROCARDIOGRAM (ECG) (EKG): ICD-10-CM

## 2024-09-04 DIAGNOSIS — Z12.5 SCREENING PSA (PROSTATE SPECIFIC ANTIGEN): ICD-10-CM

## 2024-09-04 DIAGNOSIS — E79.0 HYPERURICEMIA: ICD-10-CM

## 2024-09-04 LAB
ALBUMIN SERPL BCP-MCNC: 3.9 G/DL (ref 3.4–5)
ALP SERPL-CCNC: 70 U/L (ref 33–120)
ALT SERPL W P-5'-P-CCNC: 12 U/L (ref 10–52)
ANION GAP SERPL CALC-SCNC: 16 MMOL/L
AORTIC VALVE PEAK VELOCITY: 1.16 M/S
AST SERPL W P-5'-P-CCNC: 19 U/L (ref 9–39)
AV PEAK GRADIENT: 5.4 MMHG
AVA (PEAK VEL): 2.39 CM2
BILIRUB SERPL-MCNC: 0.6 MG/DL (ref 0–1.2)
BUN SERPL-MCNC: 9 MG/DL (ref 6–23)
CALCIUM SERPL-MCNC: 9 MG/DL (ref 8.6–10.6)
CHLORIDE SERPL-SCNC: 99 MMOL/L (ref 98–107)
CHOLEST SERPL-MCNC: 144 MG/DL (ref 0–199)
CHOLESTEROL/HDL RATIO: 2.3
CO2 SERPL-SCNC: 28 MMOL/L (ref 21–32)
CREAT SERPL-MCNC: 0.79 MG/DL (ref 0.5–1.3)
EGFRCR SERPLBLD CKD-EPI 2021: >90 ML/MIN/1.73M*2
EJECTION FRACTION APICAL 4 CHAMBER: 43.3
EJECTION FRACTION: 35 %
ERYTHROCYTE [DISTWIDTH] IN BLOOD BY AUTOMATED COUNT: 14 % (ref 11.5–14.5)
EST. AVERAGE GLUCOSE BLD GHB EST-MCNC: 103 MG/DL
FOLATE SERPL-MCNC: >24 NG/ML
GLUCOSE SERPL-MCNC: 101 MG/DL (ref 74–99)
HBA1C MFR BLD: 5.2 %
HCT VFR BLD AUTO: 46.6 % (ref 41–52)
HCV AB SER QL: NONREACTIVE
HDLC SERPL-MCNC: 61.6 MG/DL
HGB BLD-MCNC: 16 G/DL (ref 13.5–17.5)
LDLC SERPL CALC-MCNC: 62 MG/DL
LEFT ATRIUM VOLUME AREA LENGTH INDEX BSA: 26.8 ML/M2
LEFT VENTRICLE INTERNAL DIMENSION DIASTOLE: 6.44 CM (ref 3.5–6)
LEFT VENTRICULAR OUTFLOW TRACT DIAMETER: 2.43 CM
MCH RBC QN AUTO: 35.7 PG (ref 26–34)
MCHC RBC AUTO-ENTMCNC: 34.3 G/DL (ref 32–36)
MCV RBC AUTO: 104 FL (ref 80–100)
MITRAL VALVE E/A RATIO: 0.68
NON HDL CHOLESTEROL: 82 MG/DL (ref 0–149)
NRBC BLD-RTO: 0 /100 WBCS (ref 0–0)
PLATELET # BLD AUTO: 199 X10*3/UL (ref 150–450)
POTASSIUM SERPL-SCNC: 4.1 MMOL/L (ref 3.5–5.3)
PROT SERPL-MCNC: 6.6 G/DL (ref 6.4–8.2)
PSA SERPL-MCNC: 0.44 NG/ML
RBC # BLD AUTO: 4.48 X10*6/UL (ref 4.5–5.9)
RIGHT VENTRICLE FREE WALL PEAK S': 9 CM/S
SODIUM SERPL-SCNC: 139 MMOL/L (ref 136–145)
TRICUSPID ANNULAR PLANE SYSTOLIC EXCURSION: 1.8 CM
TRIGL SERPL-MCNC: 102 MG/DL (ref 0–149)
TSH SERPL-ACNC: 0.82 MIU/L (ref 0.44–3.98)
URATE SERPL-MCNC: 3.2 MG/DL (ref 4–7.5)
VLDL: 20 MG/DL (ref 0–40)
WBC # BLD AUTO: 6.9 X10*3/UL (ref 4.4–11.3)

## 2024-09-04 PROCEDURE — 99214 OFFICE O/P EST MOD 30 MIN: CPT | Performed by: NURSE PRACTITIONER

## 2024-09-04 PROCEDURE — 93306 TTE W/DOPPLER COMPLETE: CPT | Performed by: INTERNAL MEDICINE

## 2024-09-04 PROCEDURE — 36415 COLL VENOUS BLD VENIPUNCTURE: CPT

## 2024-09-04 PROCEDURE — 93306 TTE W/DOPPLER COMPLETE: CPT

## 2024-09-04 PROCEDURE — 3078F DIAST BP <80 MM HG: CPT | Performed by: NURSE PRACTITIONER

## 2024-09-04 PROCEDURE — 82746 ASSAY OF FOLIC ACID SERUM: CPT

## 2024-09-04 PROCEDURE — G0103 PSA SCREENING: HCPCS

## 2024-09-04 PROCEDURE — 85027 COMPLETE CBC AUTOMATED: CPT

## 2024-09-04 PROCEDURE — 86803 HEPATITIS C AB TEST: CPT

## 2024-09-04 PROCEDURE — 83036 HEMOGLOBIN GLYCOSYLATED A1C: CPT

## 2024-09-04 PROCEDURE — 80061 LIPID PANEL: CPT

## 2024-09-04 PROCEDURE — 3008F BODY MASS INDEX DOCD: CPT | Performed by: NURSE PRACTITIONER

## 2024-09-04 PROCEDURE — 3074F SYST BP LT 130 MM HG: CPT | Performed by: NURSE PRACTITIONER

## 2024-09-04 PROCEDURE — 84550 ASSAY OF BLOOD/URIC ACID: CPT

## 2024-09-04 PROCEDURE — 80053 COMPREHEN METABOLIC PANEL: CPT

## 2024-09-04 PROCEDURE — 84443 ASSAY THYROID STIM HORMONE: CPT

## 2024-09-04 RX ORDER — AMOXICILLIN 500 MG/1
CAPSULE ORAL
COMMUNITY
Start: 2024-08-05

## 2024-09-04 RX ORDER — IBUPROFEN 800 MG/1
1 TABLET ORAL EVERY 6 HOURS PRN
COMMUNITY
Start: 2024-08-05

## 2024-09-04 ASSESSMENT — ENCOUNTER SYMPTOMS
NEUROLOGICAL NEGATIVE: 1
DEPRESSION: 0
GASTROINTESTINAL NEGATIVE: 1
CONSTITUTIONAL NEGATIVE: 1
CARDIOVASCULAR NEGATIVE: 1
MUSCULOSKELETAL NEGATIVE: 1
LOSS OF SENSATION IN FEET: 0
OCCASIONAL FEELINGS OF UNSTEADINESS: 0
RESPIRATORY NEGATIVE: 1

## 2024-09-04 ASSESSMENT — PAIN SCALES - GENERAL: PAINLEVEL: 0-NO PAIN

## 2024-09-04 NOTE — PROGRESS NOTES
"Chief Complaint:   Follow up    History Of Present Illness:    .Mr Gage returns in follow up.  Denies chest pain, sob, palpitations or pedal edema. Had an echo done today which is not yet read.  Labs today.         Last Recorded Vitals:  Blood pressure 93/65, pulse 71, height 1.803 m (5' 11\"), weight 101 kg (222 lb 12.8 oz), SpO2 96%.     Past Medical History:  Past Medical History:   Diagnosis Date    Encounter for immunization 03/08/2022    Encounter for vaccination    Left lower quadrant pain 09/24/2020    Colicky LLQ abdominal pain    Unspecified abdominal pain 03/04/2020    Abdominal pain of multiple sites        Past Surgical History:  History reviewed. No pertinent surgical history.    Social History:  Social History     Socioeconomic History    Marital status:    Tobacco Use    Smoking status: Some Days     Types: Cigarettes, Cigars    Smokeless tobacco: Never   Substance and Sexual Activity    Alcohol use: Yes     Alcohol/week: 6.0 standard drinks of alcohol     Types: 6 Glasses of wine per week    Drug use: Never     Social Determinants of Health     Food Insecurity: Unknown (11/6/2023)    Received from [facility] Vital Sign     Worried About Running Out of Food in the Last Year: Never true       Family History:  No family history on file.      Allergies:  Patient has no known allergies.    Outpatient Medications:  Current Outpatient Medications   Medication Sig Dispense Refill    allopurinol (Zyloprim) 300 mg tablet TAKE ONE TABLET BY MOUTH EVERY DAY 90 tablet 1    amoxicillin (Amoxil) 500 mg capsule TAKE 1 TABLET BY MOUTH THREE TIMES DAILY UNTIL GONE      aspirin 81 mg chewable tablet Chew 1 tablet (81 mg) once daily.      carvedilol (Coreg) 6.25 mg tablet Take 1 tablet (6.25 mg) by mouth 2 times daily (morning and late afternoon).      colchicine 0.6 mg tablet TAKE 2 TABLETS BY MOUTH THEN 1 TABLET IN 1 HOUR. THEN TAKE 1 TABLET DAILY      cyanocobalamin " (Vitamin B-12) 1,000 mcg tablet Take 1 tablet (1,000 mcg) by mouth once daily. 30 tablet 11    folic acid (Folvite) 1 mg tablet Take 1 tablet (1 mg) by mouth once daily. 30 tablet 11    furosemide (Lasix) 40 mg tablet 0.5 tablets (20 mg) once daily.      ibuprofen 800 mg tablet Take 1 tablet (800 mg) by mouth every 6 hours if needed for pain.      naproxen (Naprosyn) 500 mg tablet Take 1 tablet by mouth twice daily as needed for pain for up to 14 days. Take with food.      sacubitriL-valsartan (Entresto) 24-26 mg tablet Take 1 tablet by mouth 2 times a day. 180 tablet 3    tiZANidine (Zanaflex) 4 mg tablet Take 1 tablet (4 mg) by mouth once daily at bedtime. PRN       No current facility-administered medications for this visit.        Physical Exam:  Cardiovascular:      PMI at left midclavicular line. Normal rate. Regular rhythm. Normal S1. Normal S2.       Murmurs: There is no murmur.      No gallop.  No click. No rub.   Pulses:     Intact distal pulses.   Edema:     Peripheral edema absent.         ROS:  Review of Systems   Constitutional: Negative.   Cardiovascular: Negative.    Respiratory: Negative.     Skin: Negative.    Musculoskeletal: Negative.    Gastrointestinal: Negative.    Genitourinary: Negative.    Neurological: Negative.           Last Labs:  CBC -  Lab Results   Component Value Date    WBC 6.7 04/18/2023    HGB 13.8 04/18/2023    HCT 41.8 04/18/2023     (H) 04/18/2023     04/18/2023       CMP -  Lab Results   Component Value Date    CALCIUM 8.6 09/09/2022    ALT 13 10/10/2022       LIPID PANEL -   Lab Results   Component Value Date    CHOL 148 09/09/2022    TRIG 164 (H) 09/09/2022    HDL 59.2 09/09/2022    CHHDL 2.5 09/09/2022    LDLF 56 09/09/2022    VLDL 33 09/09/2022       RENAL FUNCTION PANEL -   Lab Results   Component Value Date    GLUCOSE 98 09/09/2022     (L) 09/09/2022    K 4.0 09/09/2022    CL 94 (L) 09/09/2022    CO2 30 09/09/2022    ANIONGAP 12 09/09/2022    BUN 7  "09/09/2022    CREATININE 0.71 09/09/2022    GFRMALE >90 09/09/2022    CALCIUM 8.6 09/09/2022        No results found for: \"BNP\", \"HGBA1C\"      Assessment/Plan   Problem List Items Addressed This Visit    None    Assessment:     1. Nonischemic congestive cardiomyopathy. This patient was originally admitted to [facility] from 4/13/2012â€“4/17/2012 with new onset congestive heart failure. He was transferred to [facility] on 4/17/2012 to undergo right and left heart catheterization. This demonstrated normal coronary arteries with a severe dilated cardiomyopathy and an estimated LV ejection fraction in the range of only 10%. The patient was scheduled to have outpatient follow-up in order to determine whether he would be a candidate for ICD implantation but ultimately he has been very inconsistent with follow-up. The patient has actually done rather well despite the absence of ongoing follow-up. It should be noted that the patient did have a history of morbid obesity and actually weighed 435 pounds before undergoing gastric bypass in 2003. He was able to decrease his weight to 315 pounds as a result of this procedure. He began a more aggressive diet approximately 10 years ago and has lost another  pounds currently weighing 224 pounds. The etiology of his cardiomyopathy at that point may have been related to obesity and hypertension. His blood pressure is in the lower range of normal. He presently is on low-dose Entresto 24/26 mg twice daily spironolactone 25 mg daily and carvedilol s 12.5 mg daily. The patient did have a repeat echocardiogram performed on 1/19/2021 which demonstrates a moderately severe reduction in the LV ejection fraction at 30-35% with global hypokinesis. Given the low blood pressure will reduce the spironolactone from 25 mg daily to 12.5 mg daily. He is now off spironolactone due to hypotension. Return to office in 6 months. The patient states he feels improved since switching from " the lisinopril to the Entresto. Echo done 09/2022 showed EF 40-45%.     2. Obesity, status post gastric bypass 2003. As noted above the patient did weigh an maximum of 435 pounds before having a gastric bypass in 2003. This resulted in a weight loss to 315 pounds. He subsequently pursued diet and exercise program and has lost another 90 pounds currently weighing 232 pounds.     3. Type 2 diabetes. The patient previously had been diagnosed as being type II diabetic but this resolved with his weight loss.     4. Status post colonoscopic polypectomy for hyperplastic polyp 2013.     5. Miscellaneous. The patient is a cigar smoker drinks 1 glass of wine per day.          Heidi Gibson, APRN-CNP

## 2024-09-05 DIAGNOSIS — I42.9 CARDIOMYOPATHY, UNSPECIFIED TYPE (MULTI): Primary | ICD-10-CM

## 2024-09-09 ENCOUNTER — APPOINTMENT (OUTPATIENT)
Dept: PRIMARY CARE | Facility: CLINIC | Age: 59
End: 2024-09-09
Payer: MEDICARE

## 2024-09-09 VITALS
HEART RATE: 70 BPM | DIASTOLIC BLOOD PRESSURE: 71 MMHG | OXYGEN SATURATION: 94 % | WEIGHT: 226.8 LBS | SYSTOLIC BLOOD PRESSURE: 104 MMHG | TEMPERATURE: 97 F | BODY MASS INDEX: 31.63 KG/M2

## 2024-09-09 DIAGNOSIS — E53.8 VITAMIN B12 DEFICIENCY: ICD-10-CM

## 2024-09-09 DIAGNOSIS — I10 HYPERTENSION, UNSPECIFIED TYPE: ICD-10-CM

## 2024-09-09 DIAGNOSIS — E53.8 B12 DEFICIENCY: ICD-10-CM

## 2024-09-09 DIAGNOSIS — E66.09 CLASS 1 OBESITY DUE TO EXCESS CALORIES WITH SERIOUS COMORBIDITY AND BODY MASS INDEX (BMI) OF 32.0 TO 32.9 IN ADULT: ICD-10-CM

## 2024-09-09 DIAGNOSIS — Z00.00 MEDICARE ANNUAL WELLNESS VISIT, SUBSEQUENT: Primary | ICD-10-CM

## 2024-09-09 DIAGNOSIS — D75.89 MACROCYTOSIS: ICD-10-CM

## 2024-09-09 DIAGNOSIS — E79.0 HYPERURICEMIA: ICD-10-CM

## 2024-09-09 DIAGNOSIS — I42.9 CARDIOMYOPATHY, UNSPECIFIED TYPE (MULTI): ICD-10-CM

## 2024-09-09 PROBLEM — K04.7 DENTAL INFECTION: Status: RESOLVED | Noted: 2023-12-06 | Resolved: 2024-09-09

## 2024-09-09 PROBLEM — R06.09 DYSPNEA ON EXERTION: Status: RESOLVED | Noted: 2023-01-25 | Resolved: 2024-09-09

## 2024-09-09 PROBLEM — K63.8219 SMALL INTESTINAL BACTERIAL OVERGROWTH (SIBO): Status: RESOLVED | Noted: 2024-03-08 | Resolved: 2024-09-09

## 2024-09-09 PROCEDURE — 99213 OFFICE O/P EST LOW 20 MIN: CPT | Performed by: PEDIATRICS

## 2024-09-09 PROCEDURE — 3074F SYST BP LT 130 MM HG: CPT | Performed by: PEDIATRICS

## 2024-09-09 PROCEDURE — G0439 PPPS, SUBSEQ VISIT: HCPCS | Performed by: PEDIATRICS

## 2024-09-09 PROCEDURE — 3078F DIAST BP <80 MM HG: CPT | Performed by: PEDIATRICS

## 2024-09-09 RX ORDER — ALLOPURINOL 300 MG/1
300 TABLET ORAL DAILY
Qty: 90 TABLET | Refills: 1 | Status: SHIPPED | OUTPATIENT
Start: 2024-09-09

## 2024-09-09 ASSESSMENT — PATIENT HEALTH QUESTIONNAIRE - PHQ9
1. LITTLE INTEREST OR PLEASURE IN DOING THINGS: NOT AT ALL
SUM OF ALL RESPONSES TO PHQ9 QUESTIONS 1 AND 2: 0
2. FEELING DOWN, DEPRESSED OR HOPELESS: NOT AT ALL

## 2024-09-09 ASSESSMENT — ENCOUNTER SYMPTOMS
OCCASIONAL FEELINGS OF UNSTEADINESS: 0
DEPRESSION: 0
LOSS OF SENSATION IN FEET: 0

## 2024-09-09 ASSESSMENT — ACTIVITIES OF DAILY LIVING (ADL)
MANAGING_FINANCES: INDEPENDENT
TAKING_MEDICATION: INDEPENDENT
BATHING: INDEPENDENT
DRESSING: INDEPENDENT
GROCERY_SHOPPING: INDEPENDENT
DOING_HOUSEWORK: INDEPENDENT

## 2024-09-09 NOTE — PROGRESS NOTES
Subjective   Patient ID: Olaf Gage is a 59 y.o. male who presents for Medicare Wellness    HPI   PSA good;  Colonoscopy due 2027  Seeing a heart failure specialist   Not short of breath    Objective   /71 (BP Location: Right arm, Patient Position: Sitting, BP Cuff Size: Large adult)   Pulse 70   Temp 36.1 °C (97 °F) (Temporal)   Wt 103 kg (226 lb 12.8 oz)   SpO2 94%   BMI 31.63 kg/m²     Physical Exam  Vitals reviewed.   Constitutional:       General: He is not in acute distress.  HENT:      Head: Normocephalic.      Right Ear: Tympanic membrane normal.      Left Ear: Tympanic membrane normal.      Nose: Nose normal.      Mouth/Throat:      Pharynx: Oropharynx is clear.   Cardiovascular:      Rate and Rhythm: Normal rate and regular rhythm.      Heart sounds: Normal heart sounds.   Pulmonary:      Breath sounds: Normal breath sounds.   Abdominal:      Palpations: Abdomen is soft.      Tenderness: There is no abdominal tenderness.   Musculoskeletal:         General: No tenderness.   Skin:     Findings: No rash.   Neurological:      General: No focal deficit present.      Mental Status: He is alert.   Psychiatric:         Mood and Affect: Mood normal.         Assessment/Plan     Problem List Items Addressed This Visit       Cardiomyopathy (Multi)    Overview     Condition stable based on symptoms and exam. Continue established treatment plan and follow up at least yearly         Hyperuricemia    Relevant Medications    allopurinol (Zyloprim) 300 mg tablet    Macrocytosis    Vitamin B12 deficiency    Hypertension    Class 1 obesity due to excess calories with serious comorbidity and body mass index (BMI) of 32.0 to 32.9 in adult     Other Visit Diagnoses       Medicare annual wellness visit, subsequent    -  Primary    B12 deficiency        Relevant Orders    Vitamin B12    CBC and Auto Differential

## 2024-09-19 ENCOUNTER — OFFICE VISIT (OUTPATIENT)
Dept: CARDIOLOGY | Facility: CLINIC | Age: 59
End: 2024-09-19
Payer: MEDICARE

## 2024-09-19 VITALS
OXYGEN SATURATION: 97 % | DIASTOLIC BLOOD PRESSURE: 67 MMHG | BODY MASS INDEX: 31.58 KG/M2 | SYSTOLIC BLOOD PRESSURE: 103 MMHG | HEIGHT: 71 IN | WEIGHT: 225.6 LBS | HEART RATE: 71 BPM

## 2024-09-19 DIAGNOSIS — I42.9 CARDIOMYOPATHY, UNSPECIFIED TYPE (MULTI): ICD-10-CM

## 2024-09-19 PROCEDURE — 3008F BODY MASS INDEX DOCD: CPT | Performed by: INTERNAL MEDICINE

## 2024-09-19 PROCEDURE — 99214 OFFICE O/P EST MOD 30 MIN: CPT | Performed by: INTERNAL MEDICINE

## 2024-09-19 PROCEDURE — 93005 ELECTROCARDIOGRAM TRACING: CPT | Performed by: INTERNAL MEDICINE

## 2024-09-19 PROCEDURE — 3078F DIAST BP <80 MM HG: CPT | Performed by: INTERNAL MEDICINE

## 2024-09-19 PROCEDURE — 3074F SYST BP LT 130 MM HG: CPT | Performed by: INTERNAL MEDICINE

## 2024-09-19 PROCEDURE — 93010 ELECTROCARDIOGRAM REPORT: CPT | Performed by: INTERNAL MEDICINE

## 2024-09-19 ASSESSMENT — PAIN SCALES - GENERAL: PAINLEVEL: 0-NO PAIN

## 2024-09-19 NOTE — PROGRESS NOTES
I had the pleasure seeing Olaf Gage     Chief Complaint   Patient presents with    Cardiomyopathy    Heart Failure     OUTPATIENT CONSULTATION: Cardiac Electrophysiology  DOS: September 19, 2024  REASON:  CM / HF  REFERRING: Heidi Gibson CNP / Devan Martinez MD          Current Outpatient Medications   Medication Instructions    allopurinol (ZYLOPRIM) 300 mg, oral, Daily    aspirin 81 mg, oral, Daily RT    carvedilol (COREG) 6.25 mg, oral, 2 times daily (morning and late afternoon)    cyanocobalamin (VITAMIN B-12) 1,000 mcg, oral, Daily    folic acid (FOLVITE) 1 mg, oral, Daily    furosemide (Lasix) 40 mg tablet 0.5 tablets, oral, Every other day    sacubitriL-valsartan (Entresto) 24-26 mg tablet 1 tablet, oral, 2 times daily      Olaf Gage is a 59 y.o. with:     HTN  Obesity - max wt 435 lbs s/p gastric bypass (2003) It should be noted that the patient did have a history of morbid obesity and actually weighed 435 pounds before undergoing gastric bypass in 2003. He was able to decrease his weight to 315 pounds as a result of this procedure. He began a more aggressive diet approximately 10 years ago and has lost another  pounds currently weighing 224 pounds.  CM  / HFrEF - (index dx April 2012 etiology obesity) originally admitted to [facility] from 4/13/2012â€“4/17/2012 with new onset congestive heart failure. He was transferred to [facility] on 4/17/2012 to undergo right and left heart catheterization. This demonstrated normal coronary arteries with a severe dilated cardiomyopathy and an estimated LV ejection fraction in the range of only 10%. The patient was scheduled to have outpatient follow-up in order to determine whether he would be a candidate for ICD implantation but ultimately he has been very inconsistent with follow-up.  The patient did have a repeat echocardiogram performed on 1/19/2021 which demonstrates a moderately severe reduction in the LV ejection fraction at 30-35%  with global hypokinesis. Given the low blood pressure will reduce the spironolactone from 25 mg daily to 12.5 mg daily. He is now off spironolactone due to hypotension. Return to office in 6 months. The patient states he feels improved since switching from the lisinopril to the Entresto. Echo done 09/2022 showed EF 40-45%. But last ECHO shows lowering of his EF       CARDIAC TESTING:    -ECHO/ TTE:  (9/4/24) LVEF 35-40%.  LV global hypokinesis, mod LVH.  LV hypokinesis with sev hypokinesis basal inferior wall.  LVIDd 6.44 cm.     -ECHO: (9/13/22) LVEF 40-45%.  LV global hypokinesis.  LVIDd 5.97 cm.     -ECHO: (1/19/21) LVEF 30-35%    -RHC / LHC: (4/17/12 @ [place]) Normal coronary arteries with sev dilated CM.  Heart very dilated.  LVEF no greater than 10%.  RT HEART PRESSURES:  Right atrial pressure 24, right ventricular pressure 55/24, pulmonary arterial pressure 58/23, wedge pressure of 27, left ventricular end-diastolic pressure 25.  His PA saturation is pending at this time.       Objective   Physical Exam  Constitutional: alert and in no acute distress.   Eyes: no erythema, swelling or discharge from the eye .   Neck: neck is supple, symmetric, trachea midline, no masses .   Pulmonary: no increased work of breathing or signs of respiratory distress  and lungs clear to auscultation.    Cardiovascular:  regular rhythm, normal S1 and S2, no murmurs , pedal pulses 2+ bilaterally  and no edema .   Abdomen: abdomen non-tender, no masses .   Skin: skin warm and dry, normal skin turgor .   Psychiatric judgment and insight is normal  and oriented to person, place and time .             Assessment/Plan   We discussed the cardiomyopathy. He did not want the ICD in the past. We discussed the risk for sudden cardiac death. By all means if the EF remains below 35% he would be better off with the defibrillator.  All the options were reviewed with him. He wants to meet again in December to discuss further the ICD especially  if the repeat ECHO shows persistently low EF.

## 2024-09-20 LAB
ATRIAL RATE: 68 BPM
P AXIS: 69 DEGREES
P OFFSET: 199 MS
P ONSET: 140 MS
PR INTERVAL: 154 MS
Q ONSET: 217 MS
QRS COUNT: 11 BEATS
QRS DURATION: 92 MS
QT INTERVAL: 396 MS
QTC CALCULATION(BAZETT): 421 MS
QTC FREDERICIA: 413 MS
R AXIS: 27 DEGREES
T AXIS: 10 DEGREES
T OFFSET: 415 MS
VENTRICULAR RATE: 68 BPM

## 2024-09-25 DIAGNOSIS — D75.89 MACROCYTOSIS: ICD-10-CM

## 2024-09-26 DIAGNOSIS — Z01.818 PREOP TESTING: ICD-10-CM

## 2024-09-26 DIAGNOSIS — I50.9 CONGESTIVE HEART FAILURE, UNSPECIFIED HF CHRONICITY, UNSPECIFIED HEART FAILURE TYPE: ICD-10-CM

## 2024-09-26 RX ORDER — FOLIC ACID 1 MG/1
1 TABLET ORAL DAILY
Qty: 30 TABLET | Refills: 11 | Status: SHIPPED | OUTPATIENT
Start: 2024-09-26

## 2024-10-08 PROBLEM — B35.3 TINEA PEDIS: Status: ACTIVE | Noted: 2023-09-07

## 2024-10-08 NOTE — PROGRESS NOTES
Referral from Dr. Perez. Olaf comes to discuss ICD placement procedure scheduled for 12/9/24. Lizzy Brothers RN  This is a patient scheduled for substernal ICD lead placement with Dr. Perez  (electrophysiologist).  The patient is here today to meet in person and to have a chat about the procedure.  I have explained the procedure, risk and the benefits.  All the questions were answered.

## 2024-10-09 ENCOUNTER — OFFICE VISIT (OUTPATIENT)
Dept: CARDIAC SURGERY | Facility: HOSPITAL | Age: 59
End: 2024-10-09
Payer: MEDICARE

## 2024-10-09 VITALS
HEIGHT: 70 IN | WEIGHT: 223.2 LBS | HEART RATE: 74 BPM | BODY MASS INDEX: 31.95 KG/M2 | DIASTOLIC BLOOD PRESSURE: 68 MMHG | OXYGEN SATURATION: 95 % | SYSTOLIC BLOOD PRESSURE: 101 MMHG

## 2024-10-09 DIAGNOSIS — I42.9 CARDIOMYOPATHY, UNSPECIFIED TYPE (MULTI): ICD-10-CM

## 2024-10-09 PROCEDURE — 99213 OFFICE O/P EST LOW 20 MIN: CPT | Performed by: THORACIC SURGERY (CARDIOTHORACIC VASCULAR SURGERY)

## 2024-10-09 PROCEDURE — 3074F SYST BP LT 130 MM HG: CPT | Performed by: THORACIC SURGERY (CARDIOTHORACIC VASCULAR SURGERY)

## 2024-10-09 PROCEDURE — 99203 OFFICE O/P NEW LOW 30 MIN: CPT | Performed by: THORACIC SURGERY (CARDIOTHORACIC VASCULAR SURGERY)

## 2024-10-09 PROCEDURE — 3078F DIAST BP <80 MM HG: CPT | Performed by: THORACIC SURGERY (CARDIOTHORACIC VASCULAR SURGERY)

## 2024-10-09 PROCEDURE — 3008F BODY MASS INDEX DOCD: CPT | Performed by: THORACIC SURGERY (CARDIOTHORACIC VASCULAR SURGERY)

## 2024-10-09 ASSESSMENT — PAIN SCALES - GENERAL: PAINLEVEL: 0-NO PAIN

## 2024-11-06 ENCOUNTER — TELEPHONE (OUTPATIENT)
Dept: CARDIOLOGY | Facility: CLINIC | Age: 59
End: 2024-11-06
Payer: MEDICARE

## 2024-11-06 DIAGNOSIS — I42.9 CARDIOMYOPATHY, UNSPECIFIED TYPE (MULTI): Primary | ICD-10-CM

## 2024-11-06 RX ORDER — CARVEDILOL 6.25 MG/1
6.25 TABLET ORAL
Qty: 180 TABLET | Refills: 3 | Status: SHIPPED | OUTPATIENT
Start: 2024-11-06 | End: 2025-11-06

## 2024-11-07 ENCOUNTER — APPOINTMENT (OUTPATIENT)
Dept: CARDIOLOGY | Facility: CLINIC | Age: 59
End: 2024-11-07
Payer: MEDICARE

## 2024-12-03 ENCOUNTER — TELEPHONE (OUTPATIENT)
Dept: PRIMARY CARE | Facility: CLINIC | Age: 59
End: 2024-12-03
Payer: MEDICARE

## 2024-12-03 DIAGNOSIS — E79.0 HYPERURICEMIA: ICD-10-CM

## 2024-12-03 RX ORDER — ALLOPURINOL 300 MG/1
300 TABLET ORAL DAILY
Qty: 90 TABLET | Refills: 1 | Status: SHIPPED | OUTPATIENT
Start: 2024-12-03

## 2024-12-03 NOTE — TELEPHONE ENCOUNTER
Dr Rosas Pt. left a voicemail message stating need to speak with you call him back at [phone].

## 2024-12-03 NOTE — PROGRESS NOTES
I had the pleasure seeing Olaf Gage     Chief Complaint   Patient presents with    Follow-up    Hypertension      He presents for a 3 month follow-up visit and to go over ICD placement scheduled for Dec 9, 2024.      Current Outpatient Medications   Medication Instructions    allopurinol (ZYLOPRIM) 300 mg, oral, Daily    aspirin 81 mg, Daily RT    carvedilol (COREG) 6.25 mg, oral, 2 times daily (morning and late afternoon)    cyanocobalamin (VITAMIN B-12) 1,000 mcg, oral, Daily    folic acid (FOLVITE) 1 mg, oral, Daily    furosemide (Lasix) 40 mg tablet 0.5 tablets, Every other day    sacubitriL-valsartan (Entresto) 24-26 mg tablet 1 tablet, oral, 2 times daily      Olaf Gage is a 59 y.o. with:     HTN  Obesity - max wt 435 lbs s/p gastric bypass (2003) It should be noted that the patient did have a history of morbid obesity and actually weighed 435 pounds before undergoing gastric bypass in 2003. He was able to decrease his weight to 315 pounds as a result of this procedure. He began a more aggressive diet approximately 10 years ago and has lost another  pounds currently weighing 224 pounds.  CM  / HFrEF - (index dx April 2012 etiology obesity) originally admitted to [facility] from 4/13/2012â€“4/17/2012 with new onset congestive heart failure. He was transferred to [facility] on 4/17/2012 to undergo right and left heart catheterization. This demonstrated normal coronary arteries with a severe dilated cardiomyopathy and an estimated LV ejection fraction in the range of only 10%. The patient was scheduled to have outpatient follow-up in order to determine whether he would be a candidate for ICD implantation but ultimately he has been very inconsistent with follow-up.  The patient did have a repeat echocardiogram performed on 1/19/2021 which demonstrates a moderately severe reduction in the LV ejection fraction at 30-35% with global hypokinesis. Given the low blood pressure will reduce the  spironolactone from 25 mg daily to 12.5 mg daily. He is now off spironolactone due to hypotension. Return to office in 6 months. The patient states he feels improved since switching from the lisinopril to the Entresto. Echo done 09/2022 showed EF 40-45%. But last ECHO shows lowering of his EF       CARDIAC TESTING:    -ECHO/ TTE:  (9/4/24) LVEF 35-40%.  LV global hypokinesis, mod LVH.  LV hypokinesis with sev hypokinesis basal inferior wall.  LVIDd 6.44 cm.     -ECHO: (9/13/22) LVEF 40-45%.  LV global hypokinesis.  LVIDd 5.97 cm.     -ECHO: (1/19/21) LVEF 30-35%    -RHC / LHC: (4/17/12 @ [place]) Normal coronary arteries with sev dilated CM.  Heart very dilated.  LVEF no greater than 10%.  RT HEART PRESSURES:  Right atrial pressure 24, right ventricular pressure 55/24, pulmonary arterial pressure 58/23, wedge pressure of 27, left ventricular end-diastolic pressure 25.  His PA saturation is pending at this time.       Objective   Physical Exam  Constitutional: alert and in no acute distress.   Eyes: no erythema, swelling or discharge from the eye .   Neck: neck is supple, symmetric, trachea midline, no masses .   Pulmonary: no increased work of breathing or signs of respiratory distress  and lungs clear to auscultation.    Cardiovascular:  regular rhythm, normal S1 and S2, no murmurs , pedal pulses 2+ bilaterally  and no edema .   Abdomen: abdomen non-tender, no masses .   Skin: skin warm and dry, normal skin turgor .   Psychiatric judgment and insight is normal  and oriented to person, place and time .             Assessment/Plan   We discussed the cardiomyopathy. He did not want the ICD in the past. We discussed the risk for sudden cardiac death. His EF is below 35%, his brother also has the ICD. All the options were reviewed with him. We decided the substernal ICD implant. We went over the procedure and the risks associated with it. We are proceeding with it on December 9, 2024. We discussed the risks that  include but are not limited to bleeding, pericardial effusion, perforations, inappropriate shocks, pneumothorax .

## 2024-12-05 ENCOUNTER — OFFICE VISIT (OUTPATIENT)
Dept: CARDIOLOGY | Facility: CLINIC | Age: 59
End: 2024-12-05
Payer: MEDICARE

## 2024-12-05 ENCOUNTER — APPOINTMENT (OUTPATIENT)
Dept: CARDIOLOGY | Facility: CLINIC | Age: 59
End: 2024-12-05
Payer: MEDICARE

## 2024-12-05 VITALS
WEIGHT: 227.1 LBS | DIASTOLIC BLOOD PRESSURE: 70 MMHG | OXYGEN SATURATION: 98 % | BODY MASS INDEX: 31.79 KG/M2 | SYSTOLIC BLOOD PRESSURE: 112 MMHG | HEART RATE: 76 BPM | HEIGHT: 71 IN

## 2024-12-05 DIAGNOSIS — I50.22 CHRONIC SYSTOLIC HEART FAILURE: ICD-10-CM

## 2024-12-05 PROCEDURE — 3008F BODY MASS INDEX DOCD: CPT | Performed by: INTERNAL MEDICINE

## 2024-12-05 PROCEDURE — G2211 COMPLEX E/M VISIT ADD ON: HCPCS | Performed by: INTERNAL MEDICINE

## 2024-12-05 PROCEDURE — 99215 OFFICE O/P EST HI 40 MIN: CPT | Performed by: INTERNAL MEDICINE

## 2024-12-05 PROCEDURE — 3078F DIAST BP <80 MM HG: CPT | Performed by: INTERNAL MEDICINE

## 2024-12-05 PROCEDURE — 3074F SYST BP LT 130 MM HG: CPT | Performed by: INTERNAL MEDICINE

## 2024-12-05 ASSESSMENT — ENCOUNTER SYMPTOMS
LOSS OF SENSATION IN FEET: 0
OCCASIONAL FEELINGS OF UNSTEADINESS: 0
DEPRESSION: 0

## 2024-12-05 ASSESSMENT — PAIN SCALES - GENERAL: PAINLEVEL_OUTOF10: 0-NO PAIN

## 2024-12-06 ENCOUNTER — ANESTHESIA EVENT (OUTPATIENT)
Dept: CARDIOLOGY | Facility: HOSPITAL | Age: 59
End: 2024-12-06
Payer: MEDICARE

## 2024-12-06 DIAGNOSIS — R06.02 SHORTNESS OF BREATH: Primary | ICD-10-CM

## 2024-12-06 RX ORDER — FUROSEMIDE 20 MG/1
20 TABLET ORAL DAILY
Qty: 90 TABLET | Refills: 3 | Status: SHIPPED | OUTPATIENT
Start: 2024-12-06 | End: 2025-12-06

## 2024-12-06 NOTE — ANESTHESIA PREPROCEDURE EVALUATION
Patient: Olaf Gage    Procedure Information       Date/Time: 12/09/24 0800    Procedure: ICD SUBQ Implant - SUBSTERNAL ICD - GENERAL ANSTHESIA [employer]  NEEDS TO BE FIRST CASE AS DR SCHULTZ WANTS TO DO THIS CASE FIRST!    [place] [facility] [place] [facility] Cardiac Cath Lab    Providers: Monty Perez MD        Olaf Gage is a 59 y.o. with:      HTN  Obesity - max wt 435 lbs s/p gastric bypass (2003) It should be noted that the patient did have a history of morbid obesity and actually weighed 435 pounds before undergoing gastric bypass in 2003. He was able to decrease his weight to 315 pounds as a result of this procedure. He began a more aggressive diet approximately 10 years ago and has lost another  pounds currently weighing 224 pounds.  CM  / HFrEF - (index dx April 2012 etiology obesity) originally admitted to [facility] from 4/13/2012â€“4/17/2012 with new onset congestive heart failure. He was transferred to [facility] on 4/17/2012 to undergo right and left heart catheterization. This demonstrated normal coronary arteries with a severe dilated cardiomyopathy and an estimated LV ejection fraction in the range of only 10%. The patient was scheduled to have outpatient follow-up in order to determine whether he would be a candidate for ICD implantation but ultimately he has been very inconsistent with follow-up.  The patient did have a repeat echocardiogram performed on 1/19/2021 which demonstrates a moderately severe reduction in the LV ejection fraction at 30-35% with global hypokinesis. Given the low blood pressure will reduce the spironolactone from 25 mg daily to 12.5 mg daily. He is now off spironolactone due to hypotension. Return to office in 6 months. The patient states he feels improved since switching from the lisinopril to the Entresto. Echo done 09/2022 showed EF 40-45%. But last ECHO shows lowering of his EF       Relevant Problems   Cardiac   (+) Hypertension       Endocrine   (+) Class 1 obesity due to excess calories with serious comorbidity and body mass index (BMI) of 32.0 to 32.9 in adult      ID   (+) Small intestinal bacterial overgrowth (SIBO)   (+) Tinea pedis   (+) Tinea pedis of right foot       Clinical information reviewed:                        Current Outpatient Medications   Medication Instructions   • allopurinol (ZYLOPRIM) 300 mg, oral, Daily   • aspirin 81 mg, oral, Daily RT   • carvedilol (COREG) 6.25 mg, oral, 2 times daily (morning and late afternoon)   • cyanocobalamin (VITAMIN B-12) 1,000 mcg, oral, Daily   • folic acid (FOLVITE) 1 mg, oral, Daily   • furosemide (Lasix) 40 mg tablet 0.5 tablets, oral, Every other day   • sacubitriL-valsartan (Entresto) 24-26 mg tablet 1 tablet, oral, 2 times daily     CARDIAC TESTING:     -ECHO/ TTE:  (9/4/24) LVEF 35-40%.  LV global hypokinesis, mod LVH.  LV hypokinesis with sev hypokinesis basal inferior wall.  LVIDd 6.44 cm.                -ECHO: (9/13/22) LVEF 40-45%.  LV global hypokinesis.  LVIDd 5.97 cm.                -ECHO: (1/19/21) LVEF 30-35%     -RHC / LHC: (4/17/12 @ [place]) Normal coronary arteries with sev dilated CM.  Heart very dilated.  LVEF no greater than 10%.  RT HEART PRESSURES:  Right atrial pressure 24, right ventricular pressure 55/24, pulmonary arterial pressure 58/23, wedge pressure of 27, left ventricular end-diastolic pressure 25.  His PA saturation is pending at this time.             Latest Reference Range & Units 09/04/24 10:41   GLUCOSE 74 - 99 mg/dL 101 (H)   SODIUM 136 - 145 mmol/L 139   POTASSIUM 3.5 - 5.3 mmol/L 4.1   CHLORIDE 98 - 107 mmol/L 99   Bicarbonate 21 - 32 mmol/L 28   Anion Gap mmol/L 16   Blood Urea Nitrogen 6 - 23 mg/dL 9   Creatinine 0.50 - 1.30 mg/dL 0.79   EGFR >60 mL/min/1.73m*2 >90   Calcium 8.6 - 10.6 mg/dL 9.0   Albumin 3.4 - 5.0 g/dL 3.9   Alkaline Phosphatase 33 - 120 U/L 70   ALT 10 - 52 U/L 12   AST 9 - 39 U/L 19   Bilirubin Total 0.0 - 1.2 mg/dL 0.6    HDL CHOLESTEROL mg/dL 61.6   Cholesterol/HDL Ratio  2.3   LDL Calculated <=99 mg/dL 62   VLDL 0 - 40 mg/dL 20   TRIGLYCERIDES 0 - 149 mg/dL 102   Non HDL Cholesterol 0 - 149 mg/dL 82   FOLATE >5.0 ng/mL >24.0   Total Protein 6.4 - 8.2 g/dL 6.6   CHOLESTEROL 0 - 199 mg/dL 144   URIC ACID 4.0 - 7.5 mg/dL 3.2 (L)   Hemoglobin A1C see below % 5.2   Thyroid Stimulating Hormone 0.44 - 3.98 mIU/L 0.82   Estimated Average Glucose Not Established mg/dL 103   PSA <=4.00 ng/mL 0.44   WBC 4.4 - 11.3 x10*3/uL 6.9   nRBC 0.0 - 0.0 /100 WBCs 0.0   RBC 4.50 - 5.90 x10*6/uL 4.48 (L)   HEMOGLOBIN 13.5 - 17.5 g/dL 16.0   HEMATOCRIT 41.0 - 52.0 % 46.6   MCV 80 - 100 fL 104 (H)   MCH 26.0 - 34.0 pg 35.7 (H)   MCHC 32.0 - 36.0 g/dL 34.3   RED CELL DISTRIBUTION WIDTH 11.5 - 14.5 % 14.0   Platelets 150 - 450 x10*3/uL 199   Hepatitis C AB Nonreactive  Nonreactive   (H): Data is abnormally high  (L): Data is abnormally low    NPO Detail:  No data recorded     Physical Exam    Airway  Mallampati: III  TM distance: >3 FB  Neck ROM: full     Cardiovascular   Rhythm: regular  Rate: normal     Dental    Pulmonary    Abdominal          Anesthesia Plan    History of general anesthesia?: yes  History of complications of general anesthesia?: no    ASA 3     general   (Plan GA by FRANCO, art line.)  The patient is a current smoker.  Patient was previously instructed to abstain from smoking on day of procedure.  Patient did not smoke on day of procedure.    intravenous induction   Postoperative administration of opioids is intended.  Trial extubation is planned.  Anesthetic plan and risks discussed with patient.  Use of blood products discussed with patient who consented to blood products.    Plan discussed with attending.

## 2024-12-09 ENCOUNTER — APPOINTMENT (OUTPATIENT)
Dept: RADIOLOGY | Facility: HOSPITAL | Age: 59
End: 2024-12-09
Payer: MEDICARE

## 2024-12-09 ENCOUNTER — HOSPITAL ENCOUNTER (OUTPATIENT)
Facility: HOSPITAL | Age: 59
LOS: 1 days | Discharge: HOME | End: 2024-12-10
Attending: INTERNAL MEDICINE | Admitting: INTERNAL MEDICINE
Payer: MEDICARE

## 2024-12-09 ENCOUNTER — ANESTHESIA (OUTPATIENT)
Dept: CARDIOLOGY | Facility: HOSPITAL | Age: 59
End: 2024-12-09
Payer: MEDICARE

## 2024-12-09 DIAGNOSIS — I50.22 CHRONIC SYSTOLIC HEART FAILURE: ICD-10-CM

## 2024-12-09 DIAGNOSIS — Z45.02 ENCOUNTER FOR ADJUSTMENT AND MANAGEMENT OF AUTOMATIC IMPLANTABLE CARDIAC DEFIBRILLATOR: Primary | ICD-10-CM

## 2024-12-09 PROBLEM — I50.20 HFREF (HEART FAILURE WITH REDUCED EJECTION FRACTION): Status: ACTIVE | Noted: 2024-12-09

## 2024-12-09 PROCEDURE — 93641 EP EVL 1/2CHMB PAC CVDFB TST: CPT | Performed by: INTERNAL MEDICINE

## 2024-12-09 PROCEDURE — 7100000010 HC PHASE TWO TIME - EACH INCREMENTAL 1 MINUTE: Performed by: INTERNAL MEDICINE

## 2024-12-09 PROCEDURE — 2500000004 HC RX 250 GENERAL PHARMACY W/ HCPCS (ALT 636 FOR OP/ED)

## 2024-12-09 PROCEDURE — 2500000005 HC RX 250 GENERAL PHARMACY W/O HCPCS: Performed by: ANESTHESIOLOGIST ASSISTANT

## 2024-12-09 PROCEDURE — C1894 INTRO/SHEATH, NON-LASER: HCPCS | Performed by: INTERNAL MEDICINE

## 2024-12-09 PROCEDURE — 99222 1ST HOSP IP/OBS MODERATE 55: CPT | Performed by: INTERNAL MEDICINE

## 2024-12-09 PROCEDURE — A0571T: Performed by: ANESTHESIOLOGY

## 2024-12-09 PROCEDURE — C1889 IMPLANT/INSERT DEVICE, NOC: HCPCS | Performed by: INTERNAL MEDICINE

## 2024-12-09 PROCEDURE — 96374 THER/PROPH/DIAG INJ IV PUSH: CPT

## 2024-12-09 PROCEDURE — 2720000007 HC OR 272 NO HCPCS: Performed by: INTERNAL MEDICINE

## 2024-12-09 PROCEDURE — A0571T: Performed by: ANESTHESIOLOGIST ASSISTANT

## 2024-12-09 PROCEDURE — 71045 X-RAY EXAM CHEST 1 VIEW: CPT | Performed by: RADIOLOGY

## 2024-12-09 PROCEDURE — 2500000001 HC RX 250 WO HCPCS SELF ADMINISTERED DRUGS (ALT 637 FOR MEDICARE OP): Performed by: STUDENT IN AN ORGANIZED HEALTH CARE EDUCATION/TRAINING PROGRAM

## 2024-12-09 PROCEDURE — 76937 US GUIDE VASCULAR ACCESS: CPT | Performed by: ANESTHESIOLOGIST ASSISTANT

## 2024-12-09 PROCEDURE — 71045 X-RAY EXAM CHEST 1 VIEW: CPT

## 2024-12-09 PROCEDURE — 2500000004 HC RX 250 GENERAL PHARMACY W/ HCPCS (ALT 636 FOR OP/ED): Performed by: ANESTHESIOLOGIST ASSISTANT

## 2024-12-09 PROCEDURE — 2750000001 HC OR 275 NO HCPCS: Performed by: INTERNAL MEDICINE

## 2024-12-09 PROCEDURE — 3700000002 HC GENERAL ANESTHESIA TIME - EACH INCREMENTAL 1 MINUTE: Performed by: INTERNAL MEDICINE

## 2024-12-09 PROCEDURE — 3700000001 HC GENERAL ANESTHESIA TIME - INITIAL BASE CHARGE: Performed by: INTERNAL MEDICINE

## 2024-12-09 PROCEDURE — 7100000011 HC EXTENDED STAY RECOVERY HOURLY - NURSING UNIT

## 2024-12-09 PROCEDURE — 0571T INSJ/RPLCMT ICDS SS ELTRD: CPT | Performed by: INTERNAL MEDICINE

## 2024-12-09 PROCEDURE — 2780000003 HC OR 278 NO HCPCS: Performed by: INTERNAL MEDICINE

## 2024-12-09 PROCEDURE — 0571T INSJ/RPLCMT ICDS SS ELTRD: CPT | Performed by: THORACIC SURGERY (CARDIOTHORACIC VASCULAR SURGERY)

## 2024-12-09 PROCEDURE — 93641 EP EVL 1/2CHMB PAC CVDFB TST: CPT | Mod: CCI | Performed by: INTERNAL MEDICINE

## 2024-12-09 PROCEDURE — 36620 INSERTION CATHETER ARTERY: CPT | Performed by: ANESTHESIOLOGIST ASSISTANT

## 2024-12-09 PROCEDURE — 7100000009 HC PHASE TWO TIME - INITIAL BASE CHARGE: Performed by: INTERNAL MEDICINE

## 2024-12-09 PROCEDURE — C1722 AICD, SINGLE CHAMBER: HCPCS | Performed by: INTERNAL MEDICINE

## 2024-12-09 RX ORDER — PHENYLEPHRINE 10 MG/250 ML(40 MCG/ML)IN 0.9 % SOD.CHLORIDE INTRAVENOUS
CONTINUOUS PRN
Status: DISCONTINUED | OUTPATIENT
Start: 2024-12-09 | End: 2024-12-09

## 2024-12-09 RX ORDER — LIDOCAINE HYDROCHLORIDE 20 MG/ML
INJECTION, SOLUTION INFILTRATION; PERINEURAL AS NEEDED
Status: DISCONTINUED | OUTPATIENT
Start: 2024-12-09 | End: 2024-12-09

## 2024-12-09 RX ORDER — CEFAZOLIN 1 G/1
INJECTION, POWDER, FOR SOLUTION INTRAVENOUS AS NEEDED
Status: DISCONTINUED | OUTPATIENT
Start: 2024-12-09 | End: 2024-12-09

## 2024-12-09 RX ORDER — OXYCODONE HYDROCHLORIDE 5 MG/1
5 TABLET ORAL 3 TIMES DAILY PRN
Status: DISCONTINUED | OUTPATIENT
Start: 2024-12-09 | End: 2024-12-10 | Stop reason: HOSPADM

## 2024-12-09 RX ORDER — CARVEDILOL 6.25 MG/1
6.25 TABLET ORAL
Status: DISCONTINUED | OUTPATIENT
Start: 2024-12-09 | End: 2024-12-10 | Stop reason: HOSPADM

## 2024-12-09 RX ORDER — NAPROXEN SODIUM 220 MG/1
81 TABLET, FILM COATED ORAL
Status: DISCONTINUED | OUTPATIENT
Start: 2024-12-09 | End: 2024-12-10 | Stop reason: HOSPADM

## 2024-12-09 RX ORDER — PROPOFOL 10 MG/ML
INJECTION, EMULSION INTRAVENOUS AS NEEDED
Status: DISCONTINUED | OUTPATIENT
Start: 2024-12-09 | End: 2024-12-09

## 2024-12-09 RX ORDER — ACETAMINOPHEN 325 MG/1
650 TABLET ORAL EVERY 4 HOURS PRN
Status: DISCONTINUED | OUTPATIENT
Start: 2024-12-09 | End: 2024-12-10

## 2024-12-09 RX ORDER — FENTANYL CITRATE 50 UG/ML
INJECTION, SOLUTION INTRAMUSCULAR; INTRAVENOUS AS NEEDED
Status: DISCONTINUED | OUTPATIENT
Start: 2024-12-09 | End: 2024-12-09

## 2024-12-09 RX ORDER — ACETAMINOPHEN 650 MG/1
650 SUPPOSITORY RECTAL EVERY 4 HOURS PRN
Status: DISCONTINUED | OUTPATIENT
Start: 2024-12-09 | End: 2024-12-10

## 2024-12-09 RX ORDER — NORETHINDRONE AND ETHINYL ESTRADIOL 0.5-0.035
KIT ORAL AS NEEDED
Status: DISCONTINUED | OUTPATIENT
Start: 2024-12-09 | End: 2024-12-09

## 2024-12-09 RX ORDER — ROCURONIUM BROMIDE 10 MG/ML
INJECTION, SOLUTION INTRAVENOUS AS NEEDED
Status: DISCONTINUED | OUTPATIENT
Start: 2024-12-09 | End: 2024-12-09

## 2024-12-09 RX ORDER — MIDAZOLAM HYDROCHLORIDE 1 MG/ML
INJECTION INTRAMUSCULAR; INTRAVENOUS AS NEEDED
Status: DISCONTINUED | OUTPATIENT
Start: 2024-12-09 | End: 2024-12-09

## 2024-12-09 RX ORDER — ACETAMINOPHEN 160 MG/5ML
650 SOLUTION ORAL EVERY 4 HOURS PRN
Status: DISCONTINUED | OUTPATIENT
Start: 2024-12-09 | End: 2024-12-10

## 2024-12-09 RX ORDER — ALLOPURINOL 100 MG/1
300 TABLET ORAL DAILY
Status: DISCONTINUED | OUTPATIENT
Start: 2024-12-10 | End: 2024-12-10 | Stop reason: HOSPADM

## 2024-12-09 RX ORDER — PHENYLEPHRINE HCL IN 0.9% NACL 0.4MG/10ML
SYRINGE (ML) INTRAVENOUS AS NEEDED
Status: DISCONTINUED | OUTPATIENT
Start: 2024-12-09 | End: 2024-12-09

## 2024-12-09 RX ORDER — MAGNESIUM SULFATE HEPTAHYDRATE 500 MG/ML
INJECTION, SOLUTION INTRAMUSCULAR; INTRAVENOUS AS NEEDED
Status: DISCONTINUED | OUTPATIENT
Start: 2024-12-09 | End: 2024-12-09

## 2024-12-09 RX ORDER — HYDROMORPHONE HYDROCHLORIDE 1 MG/ML
0.4 INJECTION, SOLUTION INTRAMUSCULAR; INTRAVENOUS; SUBCUTANEOUS EVERY 4 HOURS PRN
Status: DISCONTINUED | OUTPATIENT
Start: 2024-12-09 | End: 2024-12-10

## 2024-12-09 RX ORDER — ONDANSETRON HYDROCHLORIDE 2 MG/ML
INJECTION, SOLUTION INTRAVENOUS AS NEEDED
Status: DISCONTINUED | OUTPATIENT
Start: 2024-12-09 | End: 2024-12-09

## 2024-12-09 RX ORDER — ACETAMINOPHEN 10 MG/ML
INJECTION, SOLUTION INTRAVENOUS AS NEEDED
Status: DISCONTINUED | OUTPATIENT
Start: 2024-12-09 | End: 2024-12-09

## 2024-12-09 RX ORDER — GLYCOPYRROLATE 0.2 MG/ML
INJECTION INTRAMUSCULAR; INTRAVENOUS AS NEEDED
Status: DISCONTINUED | OUTPATIENT
Start: 2024-12-09 | End: 2024-12-09

## 2024-12-09 RX ORDER — HYDROMORPHONE HYDROCHLORIDE 1 MG/ML
INJECTION, SOLUTION INTRAMUSCULAR; INTRAVENOUS; SUBCUTANEOUS CONTINUOUS PRN
Status: DISCONTINUED | OUTPATIENT
Start: 2024-12-09 | End: 2024-12-09

## 2024-12-09 SDOH — ECONOMIC STABILITY: TRANSPORTATION INSECURITY: IN THE PAST 12 MONTHS, HAS LACK OF TRANSPORTATION KEPT YOU FROM MEDICAL APPOINTMENTS OR FROM GETTING MEDICATIONS?: NO

## 2024-12-09 SDOH — ECONOMIC STABILITY: HOUSING INSECURITY: IN THE LAST 12 MONTHS, WAS THERE A TIME WHEN YOU WERE NOT ABLE TO PAY THE MORTGAGE OR RENT ON TIME?: YES

## 2024-12-09 SDOH — SOCIAL STABILITY: SOCIAL INSECURITY: WITHIN THE LAST YEAR, HAVE YOU BEEN AFRAID OF YOUR PARTNER OR EX-PARTNER?: NO

## 2024-12-09 SDOH — ECONOMIC STABILITY: INCOME INSECURITY: IN THE PAST 12 MONTHS HAS THE ELECTRIC, GAS, OIL, OR WATER COMPANY THREATENED TO SHUT OFF SERVICES IN YOUR HOME?: NO

## 2024-12-09 SDOH — SOCIAL STABILITY: SOCIAL INSECURITY: DO YOU FEEL UNSAFE GOING BACK TO THE PLACE WHERE YOU ARE LIVING?: NO

## 2024-12-09 SDOH — ECONOMIC STABILITY: FOOD INSECURITY: WITHIN THE PAST 12 MONTHS, THE FOOD YOU BOUGHT JUST DIDN'T LAST AND YOU DIDN'T HAVE MONEY TO GET MORE.: NEVER TRUE

## 2024-12-09 SDOH — SOCIAL STABILITY: SOCIAL INSECURITY
WITHIN THE LAST YEAR, HAVE YOU BEEN RAPED OR FORCED TO HAVE ANY KIND OF SEXUAL ACTIVITY BY YOUR PARTNER OR EX-PARTNER?: NO

## 2024-12-09 SDOH — SOCIAL STABILITY: SOCIAL INSECURITY: WITHIN THE LAST YEAR, HAVE YOU BEEN HUMILIATED OR EMOTIONALLY ABUSED IN OTHER WAYS BY YOUR PARTNER OR EX-PARTNER?: NO

## 2024-12-09 SDOH — SOCIAL STABILITY: SOCIAL INSECURITY: ABUSE: ADULT

## 2024-12-09 SDOH — SOCIAL STABILITY: SOCIAL INSECURITY: ARE THERE ANY APPARENT SIGNS OF INJURIES/BEHAVIORS THAT COULD BE RELATED TO ABUSE/NEGLECT?: NO

## 2024-12-09 SDOH — ECONOMIC STABILITY: FOOD INSECURITY: WITHIN THE PAST 12 MONTHS, YOU WORRIED THAT YOUR FOOD WOULD RUN OUT BEFORE YOU GOT THE MONEY TO BUY MORE.: NEVER TRUE

## 2024-12-09 SDOH — SOCIAL STABILITY: SOCIAL INSECURITY
WITHIN THE LAST YEAR, HAVE YOU BEEN KICKED, HIT, SLAPPED, OR OTHERWISE PHYSICALLY HURT BY YOUR PARTNER OR EX-PARTNER?: NO

## 2024-12-09 SDOH — HEALTH STABILITY: MENTAL HEALTH: CURRENT SMOKER: 1

## 2024-12-09 SDOH — ECONOMIC STABILITY: HOUSING INSECURITY: AT ANY TIME IN THE PAST 12 MONTHS, WERE YOU HOMELESS OR LIVING IN A SHELTER (INCLUDING NOW)?: NO

## 2024-12-09 SDOH — SOCIAL STABILITY: SOCIAL INSECURITY: HAVE YOU HAD THOUGHTS OF HARMING ANYONE ELSE?: NO

## 2024-12-09 SDOH — ECONOMIC STABILITY: FOOD INSECURITY: HOW HARD IS IT FOR YOU TO PAY FOR THE VERY BASICS LIKE FOOD, HOUSING, MEDICAL CARE, AND HEATING?: NOT VERY HARD

## 2024-12-09 SDOH — SOCIAL STABILITY: SOCIAL INSECURITY: DOES ANYONE TRY TO KEEP YOU FROM HAVING/CONTACTING OTHER FRIENDS OR DOING THINGS OUTSIDE YOUR HOME?: NO

## 2024-12-09 SDOH — SOCIAL STABILITY: SOCIAL INSECURITY: HAS ANYONE EVER THREATENED TO HURT YOUR FAMILY OR YOUR PETS?: NO

## 2024-12-09 SDOH — SOCIAL STABILITY: SOCIAL INSECURITY: HAVE YOU HAD ANY THOUGHTS OF HARMING ANYONE ELSE?: NO

## 2024-12-09 SDOH — SOCIAL STABILITY: SOCIAL INSECURITY: DO YOU FEEL ANYONE HAS EXPLOITED OR TAKEN ADVANTAGE OF YOU FINANCIALLY OR OF YOUR PERSONAL PROPERTY?: NO

## 2024-12-09 SDOH — SOCIAL STABILITY: SOCIAL INSECURITY: ARE YOU OR HAVE YOU BEEN THREATENED OR ABUSED PHYSICALLY, EMOTIONALLY, OR SEXUALLY BY ANYONE?: NO

## 2024-12-09 SDOH — ECONOMIC STABILITY: HOUSING INSECURITY: IN THE PAST 12 MONTHS, HOW MANY TIMES HAVE YOU MOVED WHERE YOU WERE LIVING?: 0

## 2024-12-09 ASSESSMENT — COGNITIVE AND FUNCTIONAL STATUS - GENERAL
DRESSING REGULAR UPPER BODY CLOTHING: A LITTLE
MOBILITY SCORE: 19
CLIMB 3 TO 5 STEPS WITH RAILING: A LITTLE
DAILY ACTIVITIY SCORE: 24
DRESSING REGULAR LOWER BODY CLOTHING: A LITTLE
WALKING IN HOSPITAL ROOM: A LITTLE
STANDING UP FROM CHAIR USING ARMS: A LITTLE
MOVING TO AND FROM BED TO CHAIR: A LITTLE
MOBILITY SCORE: 24
DRESSING REGULAR LOWER BODY CLOTHING: A LITTLE
WALKING IN HOSPITAL ROOM: A LITTLE
TOILETING: A LITTLE
TURNING FROM BACK TO SIDE WHILE IN FLAT BAD: A LITTLE
TURNING FROM BACK TO SIDE WHILE IN FLAT BAD: A LITTLE
STANDING UP FROM CHAIR USING ARMS: A LITTLE
MOBILITY SCORE: 19
PERSONAL GROOMING: A LITTLE
MOVING TO AND FROM BED TO CHAIR: A LITTLE
HELP NEEDED FOR BATHING: A LITTLE
DAILY ACTIVITIY SCORE: 19
CLIMB 3 TO 5 STEPS WITH RAILING: A LITTLE
PATIENT BASELINE BEDBOUND: NO

## 2024-12-09 ASSESSMENT — ACTIVITIES OF DAILY LIVING (ADL)
LACK_OF_TRANSPORTATION: NO
LACK_OF_TRANSPORTATION: NO
JUDGMENT_ADEQUATE_SAFELY_COMPLETE_DAILY_ACTIVITIES: YES
ASSISTIVE_DEVICE: EYEGLASSES
WALKS IN HOME: INDEPENDENT
HEARING - RIGHT EAR: FUNCTIONAL
ADEQUATE_TO_COMPLETE_ADL: YES
PATIENT'S MEMORY ADEQUATE TO SAFELY COMPLETE DAILY ACTIVITIES?: YES
GROOMING: INDEPENDENT
BATHING: INDEPENDENT
TOILETING: INDEPENDENT
DRESSING YOURSELF: INDEPENDENT
HEARING - LEFT EAR: FUNCTIONAL
FEEDING YOURSELF: INDEPENDENT

## 2024-12-09 ASSESSMENT — PATIENT HEALTH QUESTIONNAIRE - PHQ9
SUM OF ALL RESPONSES TO PHQ9 QUESTIONS 1 & 2: 0
1. LITTLE INTEREST OR PLEASURE IN DOING THINGS: NOT AT ALL
2. FEELING DOWN, DEPRESSED OR HOPELESS: NOT AT ALL

## 2024-12-09 ASSESSMENT — COLUMBIA-SUICIDE SEVERITY RATING SCALE - C-SSRS
1. IN THE PAST MONTH, HAVE YOU WISHED YOU WERE DEAD OR WISHED YOU COULD GO TO SLEEP AND NOT WAKE UP?: NO
6. HAVE YOU EVER DONE ANYTHING, STARTED TO DO ANYTHING, OR PREPARED TO DO ANYTHING TO END YOUR LIFE?: NO
2. HAVE YOU ACTUALLY HAD ANY THOUGHTS OF KILLING YOURSELF?: NO

## 2024-12-09 ASSESSMENT — LIFESTYLE VARIABLES
AUDIT-C TOTAL SCORE: 1
HOW MANY STANDARD DRINKS CONTAINING ALCOHOL DO YOU HAVE ON A TYPICAL DAY: 1 OR 2
SKIP TO QUESTIONS 9-10: 1
AUDIT-C TOTAL SCORE: 1
HOW OFTEN DO YOU HAVE A DRINK CONTAINING ALCOHOL: MONTHLY OR LESS
HOW OFTEN DO YOU HAVE 6 OR MORE DRINKS ON ONE OCCASION: NEVER

## 2024-12-09 ASSESSMENT — PAIN - FUNCTIONAL ASSESSMENT
PAIN_FUNCTIONAL_ASSESSMENT: 0-10

## 2024-12-09 ASSESSMENT — PAIN SCALES - GENERAL
PAINLEVEL_OUTOF10: 10 - WORST POSSIBLE PAIN
PAINLEVEL_OUTOF10: 8
PAINLEVEL_OUTOF10: 10 - WORST POSSIBLE PAIN
PAINLEVEL_OUTOF10: 8

## 2024-12-09 ASSESSMENT — PAIN DESCRIPTION - ORIENTATION: ORIENTATION: LEFT

## 2024-12-09 ASSESSMENT — PAIN DESCRIPTION - LOCATION: LOCATION: CHEST

## 2024-12-09 NOTE — ANESTHESIA PROCEDURE NOTES
Peripheral IV  Date/Time: 12/9/2024 9:14 AM      Placement  Needle size: 16 G  Laterality: right  Location: hand  Site prep: alcohol  Attempts: 1

## 2024-12-09 NOTE — CARE PLAN
The patient's goals for the shift include  pain control    The clinical goals for the shift include achieve and maintain patient pain goal of 2/10.    Problem: Pain  Goal: Takes deep breaths with improved pain control throughout the shift  Outcome: Progressing  Goal: Turns in bed with improved pain control throughout the shift  Outcome: Progressing  Goal: Walks with improved pain control throughout the shift  Outcome: Progressing  Goal: Performs ADL's with improved pain control throughout shift  Outcome: Progressing  Goal: Participates in PT with improved pain control throughout the shift  Outcome: Progressing  Goal: Free from opioid side effects throughout the shift  Outcome: Progressing  Goal: Free from acute confusion related to pain meds throughout the shift  Outcome: Progressing

## 2024-12-09 NOTE — POST-PROCEDURE NOTE
Post-Op Diagnosis:     Procedure: Lower Sub-Xyphoid incision; Left Mini Thoracotomy Incision; Placement of EVICD SLICK Device; Standard Closure.     Anesthesia: General. TTE for Pacer Placement        Antibiotics: SEE ANESTHESIA RECORD    EBL: 20ml    BSA: 2.26    Specimen: No specimens collected        Permanent pacer/ICD: Yes   -Intra-op/ Postoperative settings:   VF 250bpm  VT 188bpm    NO PACING  Surgical Assistants:  ESTER Reis      Thank you for allowing me to be a part of the care team!  Alvaro New, BS, MPH, CST, CTP, CSFA  Pager: [phone]

## 2024-12-09 NOTE — Clinical Note
Lead removed, tunneling device with sheath advanced after echo imaging of tract to verifiy not in pericardial space

## 2024-12-09 NOTE — ANESTHESIA PROCEDURE NOTES
Arterial Line:    Date/Time: 12/9/2024 9:14 AM    Staffing  Performed: resident   Authorized by: Kendall Longoria MD    Performed by: IVAN Santillan    An arterial line was placed. Procedure performed using ultrasound guidance and surface landmarks.in the OR for the following indication(s): continuous blood pressure monitoring and blood sampling needed.    A 20 gauge (size), 12 cm (length), Arrow (type) catheter was placed into the Left radial artery, secured by Tegadetyrone   Seldinger technique used.  Events:  patient tolerated procedure well with no complications.      Additional notes:  ChloraPrep. Sterile gauze and gloves used.  Good waveform.  No abnormality or hematoma.

## 2024-12-09 NOTE — ANESTHESIA PROCEDURE NOTES
Airway  Date/Time: 12/9/2024 9:08 AM  Urgency: elective    Airway not difficult    Staffing  Performed: IVAN   Authorized by: Kendall Longoria MD    Performed by: IVAN Santillan  Patient location during procedure: OR    Indications and Patient Condition  Indications for airway management: anesthesia  Spontaneous ventilation: present  Sedation level: deep  Preoxygenated: yes  Patient position: sniffing  Mask difficulty assessment: 1 - vent by mask  Planned trial extubation    Final Airway Details  Final airway type: endotracheal airway      Successful airway: ETT  Cuffed: yes   Successful intubation technique: direct laryngoscopy  Blade: Lynette  Blade size: #4  ETT size (mm): 7.5  Cormack-Lehane Classification: grade IIb - view of arytenoids or posterior of glottis only  Placement verified by: chest auscultation and capnometry   Measured from: lips  ETT to lips (cm): 22  Number of attempts at approach: 1  Ventilation between attempts: BVM    Additional Comments  Lips and teeth in preop condition.

## 2024-12-09 NOTE — ANESTHESIA POSTPROCEDURE EVALUATION
Patient: Olaf Gage    Procedure Summary       Date: 12/09/24 Room / [place] [facility] [place] [facility] / [facility] [place] [facility] Cardiac Cath Lab    Anesthesia Start: 0846 Anesthesia Stop:     Procedure: ICD SUBQ Implant (Left) Diagnosis:       Chronic systolic heart failure      (Chronic systolic heart failure [I50.22])    Providers: Monty Perez MD Responsible Provider: Kendall Longoria MD    Anesthesia Type: general ASA Status: 3            Anesthesia Type: general    Vitals Value Taken Time   BP 94/59 12/09/24 1129   Temp 36.0 12/09/24 1129   Pulse 72 12/09/24 1129   Resp 16 12/09/24 1129   SpO2 100 12/09/24 1129       Anesthesia Post Evaluation    Patient location during evaluation: PACU  Patient participation: complete - patient participated  Level of consciousness: awake  Pain management: adequate  Airway patency: patent  Cardiovascular status: acceptable  Respiratory status: acceptable  Hydration status: acceptable  Postoperative Nausea and Vomiting: none  Comments: Patient SV on 8 L/min O2 with non-rebreather.  VSS.  Report given to nurse.      There were no known notable events for this encounter.

## 2024-12-09 NOTE — Clinical Note
Patient Clipped and Prepped: chest. Prepped with ChloraPrep, a minimum of 3 minute dry time, longer if needed, no pooling noted, patient draped in sterile fashion and Duraprep. Entire chest prepped, lateral to lateral posterior to axillary line, neck to navel. Markings with fluorscopic guidance done prior.

## 2024-12-09 NOTE — H&P
"History Of Present Illness  Olaf Gage is a 59 y.o. male presenting with CMP.    Adapted from 12/5 note by Dr Perez    \"59 y.o. with:      HTN  Obesity - max wt 435 lbs s/p gastric bypass (2003) It should be noted that the patient did have a history of morbid obesity and actually weighed 435 pounds before undergoing gastric bypass in 2003. He was able to decrease his weight to 315 pounds as a result of this procedure. He began a more aggressive diet approximately 10 years ago and has lost another  pounds currently weighing 224 pounds.  CM  / HFrEF - (index dx April 2012 etiology obesity) originally admitted to [facility] from 4/13/2012â€“4/17/2012 with new onset congestive heart failure. He was transferred to [facility] on 4/17/2012 to undergo right and left heart catheterization. This demonstrated normal coronary arteries with a severe dilated cardiomyopathy and an estimated LV ejection fraction in the range of only 10%. The patient was scheduled to have outpatient follow-up in order to determine whether he would be a candidate for ICD implantation but ultimately he has been very inconsistent with follow-up.  The patient did have a repeat echocardiogram performed on 1/19/2021 which demonstrates a moderately severe reduction in the LV ejection fraction at 30-35% with global hypokinesis. Given the low blood pressure will reduce the spironolactone from 25 mg daily to 12.5 mg daily. He is now off spironolactone due to hypotension. Return to office in 6 months. The patient states he feels improved since switching from the lisinopril to the Entresto. Echo done 09/2022 showed EF 40-45%. But last ECHO shows lowering of his EF        CARDIAC TESTING:     -ECHO/ TTE:  (9/4/24) LVEF 35-40%.  LV global hypokinesis, mod LVH.  LV hypokinesis with sev hypokinesis basal inferior wall.  LVIDd 6.44 cm.                -ECHO: (9/13/22) LVEF 40-45%.  LV global hypokinesis.  LVIDd 5.97 cm.                -ECHO: " "(1/19/21) LVEF 30-35%     -RHC / LHC: (4/17/12 @ [place]) Normal coronary arteries with sev dilated CM.  Heart very dilated.  LVEF no greater than 10%.  RT HEART PRESSURES:  Right atrial pressure 24, right ventricular pressure 55/24, pulmonary arterial pressure 58/23, wedge pressure of 27, left ventricular end-diastolic pressure 25.  His PA saturation is pending at this time. \"    -feels well today  -no anginal or ADHF symptoms     Past Medical History  Past Medical History:   Diagnosis Date    Encounter for immunization 03/08/2022    Encounter for vaccination    Left lower quadrant pain 09/24/2020    Colicky LLQ abdominal pain    Small intestinal bacterial overgrowth (SIBO) 03/08/2024    Unspecified abdominal pain 03/04/2020    Abdominal pain of multiple sites       Surgical History  History reviewed. No pertinent surgical history.     Social History  He reports that he has been smoking cigarettes and cigars. He has never used smokeless tobacco. He reports current alcohol use of about 10.0 standard drinks of alcohol per week. He reports that he does not use drugs.    Family History  No family history on file.     Allergies  Patient has no known allergies.    Review of Systems     Physical Exam     Last Recorded Vitals  Height 1.803 m (5' 11\"), weight 102 kg (225 lb).    Relevant Results        Constitutional: well appearing, no distress  Eyes: no conjunctival injection  ENT: moist mucous , no apparent injury  Respiratory/Thorax: normal work of breathing on room air  Cardiovascular: normal rate, regular rhythm, extremities warm, JVP not elevated  Extremities: warm, intact      Assessment/Plan   Assessment & Plan      #CMP  -primary prevention EV-ICD       I spent 25 minutes in the professional and overall care of this patient.      Eusebia Jackson MD    "

## 2024-12-10 ENCOUNTER — APPOINTMENT (OUTPATIENT)
Dept: RADIOLOGY | Facility: HOSPITAL | Age: 59
End: 2024-12-10
Payer: MEDICARE

## 2024-12-10 ENCOUNTER — APPOINTMENT (OUTPATIENT)
Dept: CARDIOLOGY | Facility: HOSPITAL | Age: 59
End: 2024-12-10
Payer: MEDICARE

## 2024-12-10 VITALS
HEART RATE: 75 BPM | HEIGHT: 71 IN | RESPIRATION RATE: 18 BRPM | DIASTOLIC BLOOD PRESSURE: 63 MMHG | OXYGEN SATURATION: 94 % | WEIGHT: 225 LBS | TEMPERATURE: 96.8 F | SYSTOLIC BLOOD PRESSURE: 99 MMHG | BODY MASS INDEX: 31.5 KG/M2

## 2024-12-10 LAB — BODY SURFACE AREA: 2.26 M2

## 2024-12-10 PROCEDURE — 71046 X-RAY EXAM CHEST 2 VIEWS: CPT

## 2024-12-10 PROCEDURE — 7100000011 HC EXTENDED STAY RECOVERY HOURLY - NURSING UNIT

## 2024-12-10 PROCEDURE — 2500000001 HC RX 250 WO HCPCS SELF ADMINISTERED DRUGS (ALT 637 FOR MEDICARE OP): Performed by: STUDENT IN AN ORGANIZED HEALTH CARE EDUCATION/TRAINING PROGRAM

## 2024-12-10 RX ORDER — ACETAMINOPHEN 325 MG/1
975 TABLET ORAL 3 TIMES DAILY
Status: DISCONTINUED | OUTPATIENT
Start: 2024-12-10 | End: 2024-12-10 | Stop reason: HOSPADM

## 2024-12-10 RX ORDER — ACETAMINOPHEN 160 MG/5ML
650 SOLUTION ORAL 3 TIMES DAILY
Status: DISCONTINUED | OUTPATIENT
Start: 2024-12-10 | End: 2024-12-10 | Stop reason: HOSPADM

## 2024-12-10 RX ORDER — CEPHALEXIN 500 MG/1
500 CAPSULE ORAL EVERY 8 HOURS SCHEDULED
Status: DISCONTINUED | OUTPATIENT
Start: 2024-12-10 | End: 2024-12-10 | Stop reason: HOSPADM

## 2024-12-10 RX ORDER — ACETAMINOPHEN 650 MG/1
650 SUPPOSITORY RECTAL 3 TIMES DAILY
Status: DISCONTINUED | OUTPATIENT
Start: 2024-12-10 | End: 2024-12-10 | Stop reason: HOSPADM

## 2024-12-10 RX ORDER — CEPHALEXIN 500 MG/1
500 CAPSULE ORAL EVERY 8 HOURS SCHEDULED
Qty: 20 CAPSULE | Refills: 0 | Status: SHIPPED | OUTPATIENT
Start: 2024-12-10 | End: 2024-12-17

## 2024-12-10 ASSESSMENT — COGNITIVE AND FUNCTIONAL STATUS - GENERAL
MOBILITY SCORE: 24
DAILY ACTIVITIY SCORE: 24

## 2024-12-10 ASSESSMENT — PAIN SCALES - GENERAL
PAINLEVEL_OUTOF10: 7
PAINLEVEL_OUTOF10: 10 - WORST POSSIBLE PAIN
PAINLEVEL_OUTOF10: 7
PAINLEVEL_OUTOF10: 5 - MODERATE PAIN

## 2024-12-10 ASSESSMENT — PAIN DESCRIPTION - LOCATION: LOCATION: CHEST

## 2024-12-10 ASSESSMENT — PAIN DESCRIPTION - ORIENTATION: ORIENTATION: LEFT;MID

## 2024-12-10 NOTE — CARE PLAN
The patient's goals for the shift include      The clinical goals for the shift include Patient's pain improve throughout shift

## 2024-12-10 NOTE — OP NOTE
Operation Note    Date of the Operation: 12/09/2024  Name: Olaf Gage is a 59 y.o. year old male patient with   referred for  .   MRN: [id number]    Preoperative Diagnosis:   1. Encounter for adjustment and management of automatic implantable cardiac defibrillator  Cardiac device check - Inpatient    Cardiac device check - Inpatient    cephalexin (Keflex) 500 mg capsule      2. Chronic systolic heart failure (Multi)  Electrophysiology procedure    Electrophysiology procedure    Cardiac Device Check - In Clinic    Cardiac Device Check - In Clinic        Postoperative Diagnosis: The same  Operation Procedures:  #1 subxiphoid/substernal access for AICD lead placement  #2 left lateral thoracic subcutaneous pocket for the generator  #3 subcutaneous tunnel for the lead travel to the generator  Surgeon: Db Lazaro MD  Cardiologist: Dr Monty Perez  Assistants: Alvaro New    Anesthesia Type: General Endotracheal    Complications: None     Estimated Blood loss: No significant    Indication for Surgery: Patient with cardiomyopathy who was indicated to have AICD as a primaryprevention.  Due to his age and comorbidities suprasternal lead implantation was decided.    Operative Findings:    ADAM:    Direct:     Operation Description:   The patient was brought to the operative room in a stable condition.  He was put on the table in a supine position.  Monitored and general endotracheal was induced.  Prepped and draping as usual.  We started with a subxiphoid incision and then with popped into the suprasternal space bluntly with a Joie.  Once in that substernal space and guided with fluoroscopy we used a dedicated tool to cannulized the lead which was accomplished at the third attempt.  After the lectures were acceptable, the lead was tunneled into the left side pocket which was done with Dr. Perez and his team.  Once the lead was connected, a VT was induced and was successfully cardioverted by the device.  The  procedure wasconsidered successful, the hemostasis is achieved, the incisions are closed by layers, the patient is awakened and extubated in the operating room and transferred to the CICU to continue with his postoperative management.  Dictated by Dr. Db Lazaro

## 2024-12-10 NOTE — DISCHARGE INSTRUCTIONS
YOUR DEVICE IMPLANT WENT VERY WELL - ALL POSTOP CHECKS ARE WITHIN NORMAL LIMITS AND THE DEVICE IS FUNCTIONING WELL. PLEASE REFER TO THE INSTRUCTIONS BELOW REGARDING WOUND AND DEVICE CARE.     YOU WILL HAVE POSTOPERATIVE PAIN FOR SEVERAL DAYS - THAT IS EXPECTED. MOST WHO HAVE THIS PROCEDURE DO NOT REQUIRE ANY PAIN MEDICATIONS. YOU REQUIRED SCHEDULED TYLENOL IN ADDITION TO HEAVIER PAIN MEDS BRIEFLY OVERNIGHT. IT IS NOT A GOOD IDEA TO CONTINUE THESE MEDICATIONS ON DISCHARGE. WE WOULD LIKE YOU TO TAKE TYLENOL ASNEEDED, UP TO 3 GRAMS (3000 MILLIGRAMS) PER DAY UNTIL YOUR PAIN IS IMPROVED. DO NOT CONTINUE THE TYLENOL FOR MORE THAN A WEEK.     Discharge Instructions for your Cardiac Device   CARDIAC DEVICE CLINIC  [phone]              Incision:   1.  Keep your incision clean and dry for 1 week.  2. May shower 7 days after the procedure. Do not submerge the incision in a tub, pool, hot tub, or lake for 4 weeks.  3. Your incision should look better each day. If you notice unusual swelling, redness, drainage or fever greater than 100 degrees, please call the Device Clinic or your Doctor's office.  4. Avoid using deodorants, powders, creams, lotions, etc on your incision for 4 weeks.   5. There are no stitches to be removed.  If you received a “glue” closure this may appear purple-gray and does not get removed but wears away slowly on its own.  Steri-strips (small white bandages) may be removed in one week or they may fall off on their own earlier.  Pain:  1. It is normal for the area around the incision to be tender for a few weeks following surgery. Pain relievers such as Tylenol or Motrin (whichever you can take) are usually sufficient for pain relief. If the pain gets worse instead of better than please call the Device Clinic or your Doctor.  Activity:  1. If you have a new device and new leads placed than avoid raising your arm above shoulder level for 4 weeks. Do no  anything weighing more than 15 pounds.    2. Avoid exercising with the arm on the side of your pacemaker.  So no golf, swimming, tennis, bowling etc for 4 weeks.      3. Driving: If you were driving prior to your procedure, you may resume driving in 1 week. If you experienced a loss of consciousness prior to your procedure, you should verify with your Doctor when you are able to resume driving.  ID CARD:  1. It is important to carry your device ID card with you at all times.   2. Inform doctors and health care providers that you have a pacemaker or defibrillator.  Electromagnetic Interference:  1. Microwave ovens are safe to use.  2. Cellular phones should be held to the opposite ear from your device. Do not carry your phone in your shirt pocket. Some i-phones that self-charge can interfere with your device so be sure to keep it away from your pacemaker/defibrillator.   3. Read the Patient Booklet for more information. You may call either the Device Clinic [phone]  or the patient services of the device  with questions about specific electrical appliances and interference problems.    IT IS YOUR RESPONSIBILITY TO MAKE AND KEEP APPOINTMENTS.   Please refer to your Device clinic handout.

## 2024-12-10 NOTE — DISCHARGE SUMMARY
Discharge Diagnosis  HFrEF (heart failure with reduced ejection fraction)    Issues Requiring Follow-Up  Device clinic follow up - scheduled    Test Results Pending At Discharge  Pending Labs       No current pending labs.            Hospital Course   Olaf Gage is a 59 y.o. male with pmhx of NICM admitted for EV ICD placement. Procedure was uncomplicated and well tolerated. Main issue was postoperative pain which was tolerable at time of discharge. Required oxycodone prn while inpatient, in addition to scheduled tylenol q8hrs.   Postop wound check, device check, cxrs wnl with no acute abnormalities.   Patient discharged to home in stable condition.     Pertinent Physical Exam At Time of Discharge  Physical Exam  Vitals reviewed.   Constitutional:       General: He is not in acute distress.     Appearance: Normal appearance. He is normal weight.   HENT:      Head: Normocephalic and atraumatic.      Nose: Nose normal. No congestion or rhinorrhea.      Mouth/Throat:      Mouth: Mucous membranes are moist.      Pharynx: Oropharynx is clear. No oropharyngeal exudate or posterior oropharyngeal erythema.   Eyes:      Extraocular Movements: Extraocular movements intact.      Conjunctiva/sclera: Conjunctivae normal.      Pupils: Pupils are equal, round, and reactive to light.   Neck:      Vascular: No carotid bruit.   Cardiovascular:      Rate and Rhythm: Normal rate and regular rhythm.      Pulses: Normal pulses.      Heart sounds: Normal heart sounds. No murmur heard.     No friction rub. No gallop.      Comments: Incisions subxiphoid and L axillary area - dressing cdi, well apposed, no discharge erythema or induration.   Pulmonary:      Effort: Pulmonary effort is normal. No respiratory distress.      Breath sounds: Normal breath sounds. No wheezing or rales.   Abdominal:      General: Abdomen is flat. Bowel sounds are normal. There is no distension.      Palpations: Abdomen is soft.      Tenderness: There is no  abdominal tenderness.   Musculoskeletal:         General: No swelling. Normal range of motion.      Cervical back: Normal range of motion.   Lymphadenopathy:      Cervical: No cervical adenopathy.   Skin:     General: Skin is warm and dry.      Capillary Refill: Capillary refill takes less than 2 seconds.      Findings: No erythema, lesion or rash.   Neurological:      General: No focal deficit present.      Mental Status: He is alert and oriented to person, place, and time. Mental status is at baseline.   Psychiatric:         Mood and Affect: Mood normal.         Behavior: Behavior normal.         Thought Content: Thought content normal.         Judgment: Judgment normal.         Home Medications     Medication List      START taking these medications     cephalexin 500 mg capsule; Commonly known as: Keflex; Take 1 capsule   (500 mg) by mouth every 8 hours for 20 doses.     CONTINUE taking these medications     allopurinol 300 mg tablet; Commonly known as: Zyloprim; Take 1 tablet   (300 mg) by mouth once daily.   aspirin 81 mg chewable tablet   carvedilol 6.25 mg tablet; Commonly known as: Coreg; Take 1 tablet (6.25   mg) by mouth 2 times daily (morning and late afternoon).   cyanocobalamin 1,000 mcg tablet; Commonly known as: Vitamin B-12; Take 1   tablet (1,000 mcg) by mouth once daily.   Entresto 24-26 mg tablet; Generic drug: sacubitriL-valsartan; Take 1   tablet by mouth 2 times a day.   folic acid 1 mg tablet; Commonly known as: Folvite; Take 1 tablet (1 mg)   by mouth once daily.   furosemide 20 mg tablet; Commonly known as: Lasix; Take 1 tablet (20 mg)   by mouth once daily.       Outpatient Follow-Up  Future Appointments   Date Time Provider Department Center   1/16/2025 10:30 AM [id number] [place]   3/4/2025 10:00 AM Heidi Gibson APRN-CNP [id number]   3/10/2025  9:15 AM MD LEX [id number] [facility]       Jesse Grider MD

## 2024-12-12 ENCOUNTER — HOSPITAL ENCOUNTER (OUTPATIENT)
Dept: CARDIOLOGY | Facility: CLINIC | Age: 59
Discharge: HOME | End: 2024-12-12
Payer: MEDICARE

## 2024-12-12 DIAGNOSIS — I50.20 SYSTOLIC CONGESTIVE HEART FAILURE, UNSPECIFIED HF CHRONICITY: ICD-10-CM

## 2024-12-12 DIAGNOSIS — Z95.810 PRESENCE OF AUTOMATIC (IMPLANTABLE) CARDIAC DEFIBRILLATOR: ICD-10-CM

## 2024-12-16 ENCOUNTER — HOSPITAL ENCOUNTER (OUTPATIENT)
Dept: CARDIOLOGY | Facility: CLINIC | Age: 59
Discharge: HOME | End: 2024-12-16
Payer: MEDICARE

## 2024-12-16 DIAGNOSIS — I50.20 SYSTOLIC CONGESTIVE HEART FAILURE, UNSPECIFIED HF CHRONICITY: ICD-10-CM

## 2024-12-16 DIAGNOSIS — Z95.810 PRESENCE OF AUTOMATIC (IMPLANTABLE) CARDIAC DEFIBRILLATOR: ICD-10-CM

## 2024-12-23 ENCOUNTER — DOCUMENTATION (OUTPATIENT)
Dept: CARE COORDINATION | Age: 59
End: 2024-12-23
Payer: MEDICARE

## 2024-12-27 ENCOUNTER — HOSPITAL ENCOUNTER (OUTPATIENT)
Dept: CARDIOLOGY | Facility: CLINIC | Age: 59
Discharge: HOME | End: 2024-12-27
Payer: MEDICARE

## 2024-12-27 DIAGNOSIS — I50.20 SYSTOLIC CONGESTIVE HEART FAILURE, UNSPECIFIED HF CHRONICITY: ICD-10-CM

## 2024-12-27 DIAGNOSIS — Z95.810 PRESENCE OF AUTOMATIC (IMPLANTABLE) CARDIAC DEFIBRILLATOR: ICD-10-CM

## 2024-12-27 PROCEDURE — 93296 REM INTERROG EVL PM/IDS: CPT

## 2024-12-31 ENCOUNTER — HOSPITAL ENCOUNTER (OUTPATIENT)
Dept: CARDIOLOGY | Facility: CLINIC | Age: 59
Discharge: HOME | End: 2024-12-31
Payer: MEDICARE

## 2024-12-31 DIAGNOSIS — I50.20 SYSTOLIC CONGESTIVE HEART FAILURE, UNSPECIFIED HF CHRONICITY: ICD-10-CM

## 2024-12-31 DIAGNOSIS — Z95.810 PRESENCE OF AUTOMATIC (IMPLANTABLE) CARDIAC DEFIBRILLATOR: ICD-10-CM

## 2025-01-02 ENCOUNTER — HOSPITAL ENCOUNTER (OUTPATIENT)
Dept: CARDIOLOGY | Facility: CLINIC | Age: 60
Discharge: HOME | End: 2025-01-02
Payer: MEDICARE

## 2025-01-02 DIAGNOSIS — Z95.810 PRESENCE OF AUTOMATIC (IMPLANTABLE) CARDIAC DEFIBRILLATOR: ICD-10-CM

## 2025-01-02 DIAGNOSIS — I50.20 SYSTOLIC CONGESTIVE HEART FAILURE, UNSPECIFIED HF CHRONICITY: ICD-10-CM

## 2025-01-09 ENCOUNTER — HOSPITAL ENCOUNTER (OUTPATIENT)
Dept: CARDIOLOGY | Facility: CLINIC | Age: 60
Discharge: HOME | End: 2025-01-09
Payer: MEDICARE

## 2025-01-09 DIAGNOSIS — I50.20 SYSTOLIC CONGESTIVE HEART FAILURE, UNSPECIFIED HF CHRONICITY: ICD-10-CM

## 2025-01-09 DIAGNOSIS — Z95.810 PRESENCE OF AUTOMATIC (IMPLANTABLE) CARDIAC DEFIBRILLATOR: ICD-10-CM

## 2025-01-14 ENCOUNTER — HOSPITAL ENCOUNTER (OUTPATIENT)
Dept: CARDIOLOGY | Facility: CLINIC | Age: 60
Discharge: HOME | End: 2025-01-14
Payer: MEDICARE

## 2025-01-14 DIAGNOSIS — I50.20 SYSTOLIC CONGESTIVE HEART FAILURE, UNSPECIFIED HF CHRONICITY: ICD-10-CM

## 2025-01-14 DIAGNOSIS — Z95.810 PRESENCE OF AUTOMATIC (IMPLANTABLE) CARDIAC DEFIBRILLATOR: ICD-10-CM

## 2025-01-16 ENCOUNTER — HOSPITAL ENCOUNTER (OUTPATIENT)
Dept: CARDIOLOGY | Facility: CLINIC | Age: 60
Discharge: HOME | End: 2025-01-16
Payer: MEDICARE

## 2025-01-16 DIAGNOSIS — I42.9 CARDIOMYOPATHY, UNSPECIFIED TYPE (MULTI): ICD-10-CM

## 2025-01-16 DIAGNOSIS — I50.20 SYSTOLIC CONGESTIVE HEART FAILURE, UNSPECIFIED HF CHRONICITY: ICD-10-CM

## 2025-01-16 DIAGNOSIS — Z95.810 PRESENCE OF AUTOMATIC (IMPLANTABLE) CARDIAC DEFIBRILLATOR: Primary | ICD-10-CM

## 2025-01-16 DIAGNOSIS — Z95.810 PRESENCE OF AUTOMATIC (IMPLANTABLE) CARDIAC DEFIBRILLATOR: ICD-10-CM

## 2025-01-16 PROCEDURE — 93261 INTERROGATE SUBQ DEFIB: CPT | Performed by: INTERNAL MEDICINE

## 2025-01-16 PROCEDURE — 93261 INTERROGATE SUBQ DEFIB: CPT

## 2025-03-04 ENCOUNTER — OFFICE VISIT (OUTPATIENT)
Dept: CARDIOLOGY | Facility: CLINIC | Age: 60
End: 2025-03-04
Payer: MEDICARE

## 2025-03-04 VITALS
DIASTOLIC BLOOD PRESSURE: 72 MMHG | WEIGHT: 233.4 LBS | SYSTOLIC BLOOD PRESSURE: 112 MMHG | HEIGHT: 70 IN | BODY MASS INDEX: 33.41 KG/M2 | HEART RATE: 72 BPM | OXYGEN SATURATION: 97 %

## 2025-03-04 DIAGNOSIS — E53.8 VITAMIN B12 DEFICIENCY: ICD-10-CM

## 2025-03-04 DIAGNOSIS — I50.9 CONGESTIVE HEART FAILURE, UNSPECIFIED HF CHRONICITY, UNSPECIFIED HEART FAILURE TYPE: Primary | ICD-10-CM

## 2025-03-04 DIAGNOSIS — Z48.812 ENCOUNTER FOR SURGICAL AFTERCARE FOLLOWING SURGERY ON THE CIRCULATORY SYSTEM: ICD-10-CM

## 2025-03-04 DIAGNOSIS — I42.9 CARDIOMYOPATHY, UNSPECIFIED TYPE (MULTI): ICD-10-CM

## 2025-03-04 DIAGNOSIS — D75.89 MACROCYTOSIS: ICD-10-CM

## 2025-03-04 PROCEDURE — 3074F SYST BP LT 130 MM HG: CPT | Performed by: NURSE PRACTITIONER

## 2025-03-04 PROCEDURE — 93010 ELECTROCARDIOGRAM REPORT: CPT | Performed by: INTERNAL MEDICINE

## 2025-03-04 PROCEDURE — 3078F DIAST BP <80 MM HG: CPT | Performed by: NURSE PRACTITIONER

## 2025-03-04 PROCEDURE — G2211 COMPLEX E/M VISIT ADD ON: HCPCS | Performed by: NURSE PRACTITIONER

## 2025-03-04 PROCEDURE — 99214 OFFICE O/P EST MOD 30 MIN: CPT | Performed by: NURSE PRACTITIONER

## 2025-03-04 PROCEDURE — 93005 ELECTROCARDIOGRAM TRACING: CPT | Performed by: NURSE PRACTITIONER

## 2025-03-04 PROCEDURE — 3008F BODY MASS INDEX DOCD: CPT | Performed by: NURSE PRACTITIONER

## 2025-03-04 RX ORDER — SACUBITRIL AND VALSARTAN 24; 26 MG/1; MG/1
1 TABLET, FILM COATED ORAL 2 TIMES DAILY
Qty: 180 TABLET | Refills: 3 | Status: SHIPPED | OUTPATIENT
Start: 2025-03-04 | End: 2026-03-04

## 2025-03-04 RX ORDER — CARVEDILOL 6.25 MG/1
6.25 TABLET ORAL
Qty: 180 TABLET | Refills: 3 | Status: SHIPPED | OUTPATIENT
Start: 2025-03-04 | End: 2026-03-04

## 2025-03-04 ASSESSMENT — ENCOUNTER SYMPTOMS
NEUROLOGICAL NEGATIVE: 1
CARDIOVASCULAR NEGATIVE: 1
MUSCULOSKELETAL NEGATIVE: 1
DEPRESSION: 0
OCCASIONAL FEELINGS OF UNSTEADINESS: 0
CONSTITUTIONAL NEGATIVE: 1
LOSS OF SENSATION IN FEET: 0
RESPIRATORY NEGATIVE: 1
GASTROINTESTINAL NEGATIVE: 1

## 2025-03-04 ASSESSMENT — PAIN SCALES - GENERAL: PAINLEVEL_OUTOF10: 0-NO PAIN

## 2025-03-04 NOTE — PROGRESS NOTES
"Chief Complaint:   Follow-up    History Of Present Illness:    .Mr Gage returns in follow up.  Denies chest pain, sob, palpitations or pedal edema.  Reports tingling in feet at bedtime while elevated on a pillow.   Reports left arm/shoulder pain when lifting something or raising arm.  Will see pcp next week.  ECG done shows NSR.  Inst to use ED as necessary.           Last Recorded Vitals:  Blood pressure 112/72, pulse 72, height 1.778 m (5' 10\"), weight 106 kg (233 lb 6.4 oz), SpO2 97%.     Past Medical History:  Past Medical History:   Diagnosis Date    Encounter for immunization 03/08/2022    Encounter for vaccination    Left lower quadrant pain 09/24/2020    Colicky LLQ abdominal pain    Small intestinal bacterial overgrowth (SIBO) 03/08/2024    Unspecified abdominal pain 03/04/2020    Abdominal pain of multiple sites        Past Surgical History:  Past Surgical History:   Procedure Laterality Date    CARDIAC ELECTROPHYSIOLOGY PROCEDURE Left 12/9/2024    Procedure: ICD SUBQ Implant;  Surgeon: Monty Perez MD;  Location: [place] [address] Cardiac Cath Lab;  Service: Electrophysiology;  Laterality: Left;  SUBSTERNAL ICD - GENERAL ANSTHESIA [employer]  NEEDS TO BE FIRST CASE AS DR SCHULTZ WANTS TO DO THIS CASE FIRST!       Social History:  Social History     Socioeconomic History    Marital status:    Tobacco Use    Smoking status: Every Day     Types: Cigarettes, Cigars    Smokeless tobacco: Never   Substance and Sexual Activity    Alcohol use: Yes     Alcohol/week: 10.0 standard drinks of alcohol     Types: 10 Glasses of wine per week    Drug use: Never     Social Drivers of Health     Financial Resource Strain: Low Risk  (12/9/2024)    Overall Financial Resource Strain (CARDIA)     Difficulty of Paying Living Expenses: Not very hard   Food Insecurity: No Food Insecurity (12/9/2024)    Hunger Vital Sign     Worried About Running Out of Food in the Last Year: Never true     Ran Out of Food in the Last Year: " Never true   Transportation Needs: No Transportation Needs (12/9/2024)    PRAPARE - Transportation     Lack of Transportation (Medical): No     Lack of Transportation (Non-Medical): No   Intimate Partner Violence: Not At Risk (12/9/2024)    Humiliation, Afraid, Rape, and Kick questionnaire     Fear of Current or Ex-Partner: No     Emotionally Abused: No     Physically Abused: No     Sexually Abused: No   Housing Stability: High Risk (12/9/2024)    Housing Stability Vital Sign     Unable to Pay for Housing in the Last Year: Yes     Number of Times Moved in the Last Year: 0     Homeless in the Last Year: No       Family History:  No family history on file.      Allergies:  Patient has no known allergies.    Outpatient Medications:  Current Outpatient Medications   Medication Sig Dispense Refill    allopurinol (Zyloprim) 300 mg tablet Take 1 tablet (300 mg) by mouth once daily. 90 tablet 1    aspirin 81 mg chewable tablet Chew 1 tablet (81 mg) once daily.      carvedilol (Coreg) 6.25 mg tablet Take 1 tablet (6.25 mg) by mouth 2 times daily (morning and late afternoon). 180 tablet 3    cyanocobalamin (Vitamin B-12) 1,000 mcg tablet Take 1 tablet (1,000 mcg) by mouth once daily. 30 tablet 11    folic acid (Folvite) 1 mg tablet Take 1 tablet (1 mg) by mouth once daily. 30 tablet 11    sacubitriL-valsartan (Entresto) 24-26 mg tablet Take 1 tablet by mouth 2 times a day. 180 tablet 3    furosemide (Lasix) 20 mg tablet Take 1 tablet (20 mg) by mouth once daily. (Patient not taking: Reported on 3/4/2025) 90 tablet 3     No current facility-administered medications for this visit.        Physical Exam:  Cardiovascular:      PMI at left midclavicular line. Normal rate. Regular rhythm. Normal S1. Normal S2.       Murmurs: There is no murmur.      No gallop.  No click. No rub.   Pulses:     Intact distal pulses.   Edema:     Peripheral edema absent.         ROS:  Review of Systems   Constitutional: Negative.   Cardiovascular:  Negative.    Respiratory: Negative.     Skin: Negative.    Musculoskeletal: Negative.    Gastrointestinal: Negative.    Genitourinary: Negative.    Neurological: Negative.           Last Labs:  CBC -  Lab Results   Component Value Date    WBC 6.9 09/04/2024    HGB 16.0 09/04/2024    HCT 46.6 09/04/2024     (H) 09/04/2024     09/04/2024       CMP -  Lab Results   Component Value Date    CALCIUM 9.0 09/04/2024    PROT 6.6 09/04/2024    ALBUMIN 3.9 09/04/2024    AST 19 09/04/2024    ALT 12 09/04/2024    ALKPHOS 70 09/04/2024    BILITOT 0.6 09/04/2024       LIPID PANEL -   Lab Results   Component Value Date    CHOL 144 09/04/2024    TRIG 102 09/04/2024    HDL 61.6 09/04/2024    CHHDL 2.3 09/04/2024    LDLF 56 09/09/2022    VLDL 20 09/04/2024    NHDL 82 09/04/2024       RENAL FUNCTION PANEL -   Lab Results   Component Value Date    GLUCOSE 101 (H) 09/04/2024     09/04/2024    K 4.1 09/04/2024    CL 99 09/04/2024    CO2 28 09/04/2024    ANIONGAP 16 09/04/2024    BUN 9 09/04/2024    CREATININE 0.79 09/04/2024    GFRMALE >90 09/09/2022    CALCIUM 9.0 09/04/2024    ALBUMIN 3.9 09/04/2024        Lab Results   Component Value Date    HGBA1C 5.2 09/04/2024         Assessment/Plan   Problem List Items Addressed This Visit    None    Assessment:     1. Nonischemic congestive cardiomyopathy. This patient was originally admitted to [facility] from 4/13/2012â€“4/17/2012 with new onset congestive heart failure. He was transferred to [facility] on 4/17/2012 to undergo right and left heart catheterization. This demonstrated normal coronary arteries with a severe dilated cardiomyopathy and an estimated LV ejection fraction in the range of only 10%. The patient was scheduled to have outpatient follow-up in order to determine whether he would be a candidate for ICD implantation but ultimately he has been very inconsistent with follow-up. The patient has actually done rather well despite the absence of  ongoing follow-up. It should be noted that the patient did have a history of morbid obesity and actually weighed 435 pounds before undergoing gastric bypass in 2003. He was able to decrease his weight to 315 pounds as a result of this procedure. He began a more aggressive diet approximately 10 years ago and has lost another  pounds currently weighing 224 pounds. The etiology of his cardiomyopathy at that point may have been related to obesity and hypertension. His blood pressure is in the lower range of normal. He presently is on low-dose Entresto 24/26 mg twice daily spironolactone 25 mg daily and carvedilol s 12.5 mg daily. The patient did have a repeat echocardiogram performed on 1/19/2021 which demonstrates a moderately severe reduction in the LV ejection fraction at 30-35% with global hypokinesis. Given the low blood pressure will reduce the spironolactone from 25 mg daily to 12.5 mg daily. He is now off spironolactone due to hypotension. Return to office in 6 months. The patient states he feels improved since switching from the lisinopril to the Entresto. Echo done 09/2022 showed EF 40-45%.  Repeat echo done 09/2024 showed EF 35% and patient ultimately did have an ICD placed 12/2024.     2. Obesity, status post gastric bypass 2003. As noted above the patient did weigh an maximum of 435 pounds before having a gastric bypass in 2003. This resulted in a weight loss to 315 pounds. He subsequently pursued diet and exercise program and has lost another 90 pounds currently weighing 232 pounds.     3. Type 2 diabetes. The patient previously had been diagnosed as being type II diabetic but this resolved with his weight loss.     4. Status post colonoscopic polypectomy for hyperplastic polyp 2013.     5. Miscellaneous. The patient is a cigar smoker drinks 1 glass of wine per day.          Heidi Gibson, APRN-CNP

## 2025-03-05 LAB
ATRIAL RATE: 69 BPM
P AXIS: 79 DEGREES
P OFFSET: 199 MS
P ONSET: 137 MS
PR INTERVAL: 156 MS
Q ONSET: 215 MS
QRS COUNT: 12 BEATS
QRS DURATION: 90 MS
QT INTERVAL: 390 MS
QTC CALCULATION(BAZETT): 417 MS
QTC FREDERICIA: 408 MS
R AXIS: 48 DEGREES
T AXIS: -12 DEGREES
T OFFSET: 410 MS
VENTRICULAR RATE: 69 BPM

## 2025-03-07 ENCOUNTER — HOSPITAL ENCOUNTER (OUTPATIENT)
Dept: CARDIOLOGY | Facility: CLINIC | Age: 60
Discharge: HOME | End: 2025-03-07
Payer: MEDICARE

## 2025-03-07 DIAGNOSIS — I42.9 CARDIOMYOPATHY WITH IMPLANTABLE CARDIOVERTER-DEFIBRILLATOR (MULTI): ICD-10-CM

## 2025-03-07 DIAGNOSIS — Z95.810 CARDIOMYOPATHY WITH IMPLANTABLE CARDIOVERTER-DEFIBRILLATOR (MULTI): ICD-10-CM

## 2025-03-07 DIAGNOSIS — Z95.810 PRESENCE OF AUTOMATIC (IMPLANTABLE) CARDIAC DEFIBRILLATOR: ICD-10-CM

## 2025-03-07 DIAGNOSIS — I50.20 SYSTOLIC CONGESTIVE HEART FAILURE, UNSPECIFIED HF CHRONICITY: ICD-10-CM

## 2025-03-09 RX ORDER — FOLIC ACID 1 MG/1
1 TABLET ORAL DAILY
Qty: 30 TABLET | Refills: 11 | Status: SHIPPED | OUTPATIENT
Start: 2025-03-09

## 2025-03-09 RX ORDER — LANOLIN ALCOHOL/MO/W.PET/CERES
1000 CREAM (GRAM) TOPICAL DAILY
Qty: 30 TABLET | Refills: 11 | Status: SHIPPED | OUTPATIENT
Start: 2025-03-09 | End: 2025-03-12

## 2025-03-10 ENCOUNTER — APPOINTMENT (OUTPATIENT)
Dept: PRIMARY CARE | Facility: CLINIC | Age: 60
End: 2025-03-10
Payer: MEDICARE

## 2025-03-10 VITALS
WEIGHT: 227 LBS | HEART RATE: 69 BPM | SYSTOLIC BLOOD PRESSURE: 110 MMHG | DIASTOLIC BLOOD PRESSURE: 70 MMHG | BODY MASS INDEX: 32.57 KG/M2 | OXYGEN SATURATION: 96 %

## 2025-03-10 DIAGNOSIS — E79.0 HYPERURICEMIA: ICD-10-CM

## 2025-03-10 DIAGNOSIS — I10 HYPERTENSION, UNSPECIFIED TYPE: ICD-10-CM

## 2025-03-10 DIAGNOSIS — K63.8219 SMALL INTESTINAL BACTERIAL OVERGROWTH (SIBO): ICD-10-CM

## 2025-03-10 DIAGNOSIS — E66.09 CLASS 1 OBESITY DUE TO EXCESS CALORIES WITH SERIOUS COMORBIDITY AND BODY MASS INDEX (BMI) OF 32.0 TO 32.9 IN ADULT: ICD-10-CM

## 2025-03-10 DIAGNOSIS — R73.03 PREDIABETES: ICD-10-CM

## 2025-03-10 DIAGNOSIS — R20.0 NUMBNESS IN FEET: ICD-10-CM

## 2025-03-10 DIAGNOSIS — E66.811 CLASS 1 OBESITY DUE TO EXCESS CALORIES WITH SERIOUS COMORBIDITY AND BODY MASS INDEX (BMI) OF 32.0 TO 32.9 IN ADULT: ICD-10-CM

## 2025-03-10 DIAGNOSIS — Z00.00 MEDICARE ANNUAL WELLNESS VISIT, SUBSEQUENT: Primary | ICD-10-CM

## 2025-03-10 DIAGNOSIS — I42.9 CARDIOMYOPATHY, UNSPECIFIED TYPE (MULTI): ICD-10-CM

## 2025-03-10 DIAGNOSIS — E53.8 VITAMIN B12 DEFICIENCY: ICD-10-CM

## 2025-03-10 DIAGNOSIS — D75.89 MACROCYTOSIS: ICD-10-CM

## 2025-03-10 DIAGNOSIS — I50.20 HFREF (HEART FAILURE WITH REDUCED EJECTION FRACTION): ICD-10-CM

## 2025-03-10 PROBLEM — M10.9 ACUTE GOUT: Status: RESOLVED | Noted: 2023-09-07 | Resolved: 2025-03-10

## 2025-03-10 PROBLEM — R14.0 ABDOMINAL BLOATING: Status: RESOLVED | Noted: 2023-01-25 | Resolved: 2025-03-10

## 2025-03-10 LAB
ANION GAP SERPL CALC-SCNC: 14 MMOL/L (ref 10–20)
BASOPHILS # BLD AUTO: 0.02 X10*3/UL (ref 0.1–1.6)
BASOPHILS NFR BLD AUTO: 0.33 % (ref 0–0.3)
BUN SERPL-MCNC: 11 MG/DL (ref 7–18)
CALCIUM SERPL-MCNC: 8.7 MG/DL (ref 8.5–10.1)
CHLORIDE SERPL-SCNC: 104 MMOL/L (ref 98–107)
CO2 SERPL-SCNC: 29 MMOL/L (ref 21–32)
CREAT SERPL-MCNC: 0.68 MG/DL (ref 0.6–1.1)
EGFRCR SERPLBLD CKD-EPI 2021: >90 ML/MIN/1.73M*2
EOSINOPHIL # BLD AUTO: 0.08 X10*3/UL (ref 0.04–0.5)
EOSINOPHIL NFR BLD AUTO: 1.44 % (ref 0.7–7)
ERYTHROCYTE [DISTWIDTH] IN BLOOD BY AUTOMATED COUNT: 13.3 % (ref 11.5–14.5)
GLUCOSE SERPL-MCNC: 93 MG/DL (ref 74–100)
HBA1C MFR BLD: 5.2 %
HCT VFR BLD AUTO: 44.9 % (ref 38.4–51.3)
HGB BLD-MCNC: 16.15 G/DL (ref 12.7–17)
LYMPHOCYTES # BLD AUTO: 2.17 X10*3/UL (ref 0–6)
LYMPHOCYTES NFR BLD AUTO: 39.54 % (ref 20.5–51.1)
MCH RBC QN AUTO: 37.9 PG (ref 26–32)
MCHC RBC AUTO-ENTMCNC: 36 G/DL (ref 31–38)
MCV RBC AUTO: 105.5 FL (ref 80–96)
MONOCYTES # BLD AUTO: 0.45 X10*3/UL (ref 1.6–24.9)
MONOCYTES NFR BLD AUTO: 8.27 % (ref 1.7–9.3)
NEUTROPHILS # BLD AUTO: 2.77 X10*3/UL (ref 1.4–6.5)
NEUTROPHILS NFR BLD AUTO: 50.42 % (ref 42.2–75.2)
PLATELET # BLD AUTO: 173.2 X10*3/UL (ref 150–450)
PMV BLD AUTO: 8.51 FL (ref 7.8–11)
POTASSIUM SERPL-SCNC: 4.5 MMOL/L (ref 3.5–5.1)
RBC # BLD AUTO: 4.26 X10*6/UL (ref 4.1–5.6)
SODIUM SERPL-SCNC: 142 MMOL/L (ref 136–145)
TSH SERPL-ACNC: 1.3 MIU/L (ref 0.44–3.98)
VIT B12 SERPL-MCNC: 1231 PG/ML (ref 193–986)
WBC # BLD AUTO: 5.5 X10*3/UL (ref 4.5–10.5)

## 2025-03-10 PROCEDURE — 3074F SYST BP LT 130 MM HG: CPT | Performed by: PEDIATRICS

## 2025-03-10 PROCEDURE — 99214 OFFICE O/P EST MOD 30 MIN: CPT | Performed by: PEDIATRICS

## 2025-03-10 PROCEDURE — 83036 HEMOGLOBIN GLYCOSYLATED A1C: CPT | Performed by: PEDIATRICS

## 2025-03-10 PROCEDURE — 85025 COMPLETE CBC W/AUTO DIFF WBC: CPT | Performed by: PEDIATRICS

## 2025-03-10 PROCEDURE — 82607 VITAMIN B-12: CPT | Performed by: PEDIATRICS

## 2025-03-10 PROCEDURE — 80048 BASIC METABOLIC PNL TOTAL CA: CPT | Performed by: PEDIATRICS

## 2025-03-10 PROCEDURE — 3078F DIAST BP <80 MM HG: CPT | Performed by: PEDIATRICS

## 2025-03-10 PROCEDURE — 84443 ASSAY THYROID STIM HORMONE: CPT | Performed by: PEDIATRICS

## 2025-03-10 PROCEDURE — G0439 PPPS, SUBSEQ VISIT: HCPCS | Performed by: PEDIATRICS

## 2025-03-10 ASSESSMENT — ACTIVITIES OF DAILY LIVING (ADL)
GROCERY_SHOPPING: INDEPENDENT
DOING_HOUSEWORK: INDEPENDENT
MANAGING_FINANCES: INDEPENDENT
DRESSING: INDEPENDENT
TAKING_MEDICATION: INDEPENDENT
BATHING: INDEPENDENT

## 2025-03-10 ASSESSMENT — PATIENT HEALTH QUESTIONNAIRE - PHQ9
2. FEELING DOWN, DEPRESSED OR HOPELESS: NOT AT ALL
SUM OF ALL RESPONSES TO PHQ9 QUESTIONS 1 AND 2: 0
1. LITTLE INTEREST OR PLEASURE IN DOING THINGS: NOT AT ALL

## 2025-03-10 NOTE — PROGRESS NOTES
Subjective   Patient ID: Olaf Gage is a 60 y.o. male who presents for Medicare Wellness    HPI   Smokes one cigar every 2 days; drinks one glass of wine 5 times a week.  Does treadmill daily  Colonoscopy due 2027  PSA normal in Sept  Review of Systems  Feet gets numb in bed L>R for last half year  Objective   /70   Pulse 69   Wt 103 kg (227 lb)   SpO2 96%   BMI 32.57 kg/m²     Physical Exam  Vitals reviewed.   Constitutional:       General: He is not in acute distress.  HENT:      Head: Normocephalic.      Right Ear: Tympanic membrane normal.      Left Ear: Tympanic membrane normal.      Nose: Nose normal.      Mouth/Throat:      Pharynx: Oropharynx is clear.   Cardiovascular:      Rate and Rhythm: Normal rate and regular rhythm.      Heart sounds: Normal heart sounds.   Pulmonary:      Breath sounds: Normal breath sounds.   Abdominal:      Palpations: Abdomen is soft.      Tenderness: There is no abdominal tenderness.   Musculoskeletal:         General: No tenderness.   Skin:     Findings: No rash.   Neurological:      General: No focal deficit present.      Mental Status: He is alert.   Psychiatric:         Mood and Affect: Mood normal.         Assessment/Plan   Problem List Items Addressed This Visit             ICD-10-CM    Cardiomyopathy I42.9    Hyperuricemia E79.0    Macrocytosis D75.89    Relevant Orders    Thyroid Stimulating Hormone    Folate    Vitamin B12 deficiency E53.8    Relevant Orders    CBC w/5 Part Differential, [facility]    Vitamin B12    Hypertension I10    Relevant Orders    Basic Metabolic Panel    Class 1 obesity due to excess calories with serious comorbidity and body mass index (BMI) of 32.0 to 32.9 in adult E66.811, E66.09, Z68.32    Small intestinal bacterial overgrowth (SIBO) K63.8219     Diarrhea is rare now         HFrEF (heart failure with reduced ejection fraction) I50.20    Numbness in feet R20.0    Relevant Orders    Thyroid Stimulating Hormone    Prediabetes  R73.03    Relevant Orders    Hemoglobin A1C     Other Visit Diagnoses         Codes    Medicare annual wellness visit, subsequent    -  Primary Z00.00

## 2025-03-11 DIAGNOSIS — E53.8 VITAMIN B12 DEFICIENCY: ICD-10-CM

## 2025-03-11 DIAGNOSIS — D75.89 MACROCYTOSIS: Primary | ICD-10-CM

## 2025-03-11 LAB — FOLATE SERPL-MCNC: 21.9 NG/ML

## 2025-03-12 ENCOUNTER — TELEPHONE (OUTPATIENT)
Dept: PRIMARY CARE | Facility: CLINIC | Age: 60
End: 2025-03-12
Payer: MEDICARE

## 2025-03-12 RX ORDER — LANOLIN ALCOHOL/MO/W.PET/CERES
CREAM (GRAM) TOPICAL
Qty: 30 TABLET | Refills: 11 | OUTPATIENT
Start: 2025-03-12

## 2025-03-12 NOTE — TELEPHONE ENCOUNTER
----- Message from Abril Rosas sent at 3/12/2025  6:13 AM EDT -----  Labs good except B12 too high (so take supplement 3 times a week instead of daily) and size of the red blood cells (MCV) is too high-->for this please see a hematologist.

## 2025-03-12 NOTE — TELEPHONE ENCOUNTER
I notified Pt. Olaf Gage of Dr. Rosas message. Pt. expressed understanding of the message given. I message  Cathy our patient  to assist with scheduling a hemologist appointment.

## 2025-03-18 ENCOUNTER — HOSPITAL ENCOUNTER (OUTPATIENT)
Dept: VASCULAR MEDICINE | Facility: CLINIC | Age: 60
Discharge: HOME | End: 2025-03-18
Payer: MEDICARE

## 2025-03-18 DIAGNOSIS — Z48.812 ENCOUNTER FOR SURGICAL AFTERCARE FOLLOWING SURGERY ON THE CIRCULATORY SYSTEM: ICD-10-CM

## 2025-03-18 DIAGNOSIS — I42.9 CARDIOMYOPATHY, UNSPECIFIED TYPE (MULTI): ICD-10-CM

## 2025-03-18 DIAGNOSIS — I50.9 CONGESTIVE HEART FAILURE, UNSPECIFIED HF CHRONICITY, UNSPECIFIED HEART FAILURE TYPE: ICD-10-CM

## 2025-03-18 DIAGNOSIS — I73.9 PERIPHERAL VASCULAR DISEASE, UNSPECIFIED (CMS-HCC): ICD-10-CM

## 2025-03-18 PROCEDURE — 93922 UPR/L XTREMITY ART 2 LEVELS: CPT | Performed by: SURGERY

## 2025-03-18 PROCEDURE — 93922 UPR/L XTREMITY ART 2 LEVELS: CPT

## 2025-03-19 ENCOUNTER — TELEPHONE (OUTPATIENT)
Dept: CARDIOLOGY | Facility: CLINIC | Age: 60
End: 2025-03-19
Payer: MEDICARE

## 2025-03-19 NOTE — TELEPHONE ENCOUNTER
Result Communication    Resulted Orders   Vascular US ankle brachial index (QUIRINO) without exercise    Narrative                 [facility]  [address] [place] [address]    Tel [phone] and Fax [phone]       Vascular Lab Report  VASC US ANKLE BRACHIAL INDEX (QUIRINO) WITHOUT EXERCISE       Patient Name:      MITESH BUCK         Reading Physician:  [id number] Rebekah Wynn MD  Study Date:        3/18/2025            Ordering Physician: [id number] DAMIAN LOPEZ  MRN/PID:           [id number]             Technologist:       Zhao Abad T  Accession#:        [id number]         Technologist 2:  Date of Birth/Age: 1965 / 60 years Encounter#:         [id number]  Gender:            M  Admission Status:  Outpatient           Location Performed: [facility]       Diagnosis/ICD: Peripheral vascular disease, unspecified-I73.9  CPT Codes:     30119 Peripheral artery QUIRINO Only       CONCLUSIONS:  Right Lower PVR: No evidence of arterial occlusive disease in the right lower extremity at rest. Normal digital perfusion noted. Multiphasic flow is noted in the right common femoral artery, right dorsalis pedis artery and right posterior tibial artery.  Left Lower PVR: No evidence of arterial occlusive disease in the left lower extremity at rest. Normal digital perfusion noted. Multiphasic flow is noted in the left common femoral artery, left dorsalis pedis artery and left posterior tibial artery.     Imaging & Doppler Findings:     RIGHT Lower PVR                Pressures Ratios  Right Posterior Tibial (Ankle) 136 mmHg  1.14  Right Dorsalis Pedis (Ankle)   123 mmHg  1.03  Right Digit (Great Toe)        79 mmHg   0.66          LEFT Lower PVR                Pressures Ratios  Left Posterior Tibial (Ankle) 147 mmHg  1.24  Left Dorsalis Pedis (Ankle)   127 mmHg  1.07  Left Digit  (Great Toe)        77 mmHg   0.65                          Right     Left  Brachial Pressure 119 mmHg 111 mmHg          [id number] Rebekah Wynn MD  Electronically signed by [id number] Rebekah Wynn MD on 3/18/2025 at 8:26:08 PM         ** Final **         2:37 PM      Results were successfully communicated with the patient and they acknowledged their understanding.    Called and spoke to patient , patient seen pcp regarding tingling in legs, patient states he was also referred to hematology ,appointment with Azalea Gee PA-C on 6/9

## 2025-03-19 NOTE — RESULT ENCOUNTER NOTE
Please call patient with results.  Study of legs was negative.  See pcp for complaints of tingling in legs.

## 2025-03-19 NOTE — TELEPHONE ENCOUNTER
----- Message from Heidi Gibson sent at 3/19/2025 10:31 AM EDT -----  Please call patient with results.  Study of legs was negative.  See pcp for complaints of tingling in legs.

## 2025-03-21 ENCOUNTER — HOSPITAL ENCOUNTER (OUTPATIENT)
Dept: CARDIOLOGY | Facility: CLINIC | Age: 60
Discharge: HOME | End: 2025-03-21
Payer: MEDICARE

## 2025-03-21 DIAGNOSIS — Z95.810 CARDIOMYOPATHY WITH IMPLANTABLE CARDIOVERTER-DEFIBRILLATOR (MULTI): ICD-10-CM

## 2025-03-21 DIAGNOSIS — I42.9 CARDIOMYOPATHY WITH IMPLANTABLE CARDIOVERTER-DEFIBRILLATOR (MULTI): ICD-10-CM

## 2025-03-21 DIAGNOSIS — Z95.810 PRESENCE OF AUTOMATIC (IMPLANTABLE) CARDIAC DEFIBRILLATOR: ICD-10-CM

## 2025-03-21 DIAGNOSIS — I50.20 SYSTOLIC CONGESTIVE HEART FAILURE, UNSPECIFIED HF CHRONICITY: ICD-10-CM

## 2025-03-31 ENCOUNTER — HOSPITAL ENCOUNTER (OUTPATIENT)
Dept: CARDIOLOGY | Facility: CLINIC | Age: 60
Discharge: HOME | End: 2025-03-31
Payer: MEDICARE

## 2025-03-31 DIAGNOSIS — I50.20 SYSTOLIC CONGESTIVE HEART FAILURE, UNSPECIFIED HF CHRONICITY: ICD-10-CM

## 2025-03-31 DIAGNOSIS — Z95.810 CARDIOMYOPATHY WITH IMPLANTABLE CARDIOVERTER-DEFIBRILLATOR: ICD-10-CM

## 2025-03-31 DIAGNOSIS — Z95.810 PRESENCE OF AUTOMATIC (IMPLANTABLE) CARDIAC DEFIBRILLATOR: ICD-10-CM

## 2025-03-31 DIAGNOSIS — I42.9 CARDIOMYOPATHY WITH IMPLANTABLE CARDIOVERTER-DEFIBRILLATOR: ICD-10-CM

## 2025-03-31 PROCEDURE — 93296 REM INTERROG EVL PM/IDS: CPT

## 2025-03-31 PROCEDURE — 93295 DEV INTERROG REMOTE 1/2/MLT: CPT | Performed by: INTERNAL MEDICINE

## 2025-04-03 PROBLEM — E53.8 FOLATE DEFICIENCY: Status: ACTIVE | Noted: 2025-04-03

## 2025-04-07 ENCOUNTER — HOSPITAL ENCOUNTER (OUTPATIENT)
Dept: CARDIOLOGY | Facility: CLINIC | Age: 60
Discharge: HOME | End: 2025-04-07
Payer: MEDICARE

## 2025-04-07 DIAGNOSIS — I50.20 SYSTOLIC CONGESTIVE HEART FAILURE, UNSPECIFIED HF CHRONICITY: ICD-10-CM

## 2025-04-07 DIAGNOSIS — Z95.810 CARDIOMYOPATHY WITH IMPLANTABLE CARDIOVERTER-DEFIBRILLATOR: ICD-10-CM

## 2025-04-07 DIAGNOSIS — I42.9 CARDIOMYOPATHY WITH IMPLANTABLE CARDIOVERTER-DEFIBRILLATOR: ICD-10-CM

## 2025-04-07 DIAGNOSIS — Z95.810 PRESENCE OF AUTOMATIC (IMPLANTABLE) CARDIAC DEFIBRILLATOR: ICD-10-CM

## 2025-04-17 ENCOUNTER — HOSPITAL ENCOUNTER (OUTPATIENT)
Dept: CARDIOLOGY | Facility: CLINIC | Age: 60
Discharge: HOME | End: 2025-04-17
Payer: MEDICARE

## 2025-04-17 DIAGNOSIS — I42.9 CARDIOMYOPATHY, UNSPECIFIED TYPE (MULTI): ICD-10-CM

## 2025-04-17 DIAGNOSIS — Z95.810 PRESENCE OF AUTOMATIC (IMPLANTABLE) CARDIAC DEFIBRILLATOR: ICD-10-CM

## 2025-04-17 DIAGNOSIS — I50.20 SYSTOLIC CONGESTIVE HEART FAILURE, UNSPECIFIED HF CHRONICITY: ICD-10-CM

## 2025-04-17 DIAGNOSIS — Z95.810 PRESENCE OF AUTOMATIC (IMPLANTABLE) CARDIAC DEFIBRILLATOR: Primary | ICD-10-CM

## 2025-04-17 PROCEDURE — 93260 PRGRMG DEV EVAL IMPLTBL SYS: CPT | Performed by: INTERNAL MEDICINE

## 2025-04-17 PROCEDURE — 93260 PRGRMG DEV EVAL IMPLTBL SYS: CPT

## 2025-06-09 ENCOUNTER — OFFICE VISIT (OUTPATIENT)
Dept: HEMATOLOGY/ONCOLOGY | Facility: CLINIC | Age: 60
End: 2025-06-09
Payer: MEDICARE

## 2025-06-09 ENCOUNTER — LAB (OUTPATIENT)
Dept: LAB | Facility: CLINIC | Age: 60
End: 2025-06-09
Payer: MEDICARE

## 2025-06-09 VITALS
OXYGEN SATURATION: 98 % | HEART RATE: 66 BPM | RESPIRATION RATE: 18 BRPM | WEIGHT: 226.85 LBS | BODY MASS INDEX: 32.55 KG/M2 | SYSTOLIC BLOOD PRESSURE: 103 MMHG | DIASTOLIC BLOOD PRESSURE: 65 MMHG | TEMPERATURE: 96.1 F

## 2025-06-09 DIAGNOSIS — D63.8 ANEMIA IN OTHER CHRONIC DISEASES CLASSIFIED ELSEWHERE: ICD-10-CM

## 2025-06-09 DIAGNOSIS — D64.9 ANEMIA, UNSPECIFIED TYPE: ICD-10-CM

## 2025-06-09 DIAGNOSIS — Z98.84 GASTRIC BYPASS STATUS FOR OBESITY: ICD-10-CM

## 2025-06-09 DIAGNOSIS — D75.89 MACROCYTOSIS: ICD-10-CM

## 2025-06-09 LAB
ALBUMIN SERPL BCP-MCNC: 3.7 G/DL (ref 3.4–5)
ALP SERPL-CCNC: 61 U/L (ref 33–120)
ALT SERPL W P-5'-P-CCNC: 16 U/L (ref 10–52)
ANION GAP SERPL CALC-SCNC: 12 MMOL/L (ref 10–20)
AST SERPL W P-5'-P-CCNC: 25 U/L (ref 9–39)
BASOPHILS # BLD AUTO: 0.02 X10*3/UL (ref 0–0.1)
BASOPHILS NFR BLD AUTO: 0.4 %
BILIRUB SERPL-MCNC: 0.7 MG/DL (ref 0–1.2)
BUN SERPL-MCNC: 7 MG/DL (ref 6–23)
CALCIUM SERPL-MCNC: 8.6 MG/DL (ref 8.6–10.3)
CHLORIDE SERPL-SCNC: 101 MMOL/L (ref 98–107)
CO2 SERPL-SCNC: 28 MMOL/L (ref 21–32)
CREAT SERPL-MCNC: 0.69 MG/DL (ref 0.5–1.3)
EGFRCR SERPLBLD CKD-EPI 2021: >90 ML/MIN/1.73M*2
EOSINOPHIL # BLD AUTO: 0.12 X10*3/UL (ref 0–0.7)
EOSINOPHIL NFR BLD AUTO: 2.1 %
ERYTHROCYTE [DISTWIDTH] IN BLOOD BY AUTOMATED COUNT: 13.8 % (ref 11.5–14.5)
FERRITIN SERPL-MCNC: 97 NG/ML (ref 20–300)
GLUCOSE SERPL-MCNC: 98 MG/DL (ref 74–99)
HCT VFR BLD AUTO: 43.5 % (ref 41–52)
HGB BLD-MCNC: 14.9 G/DL (ref 13.5–17.5)
HIV 1+2 AB+HIV1 P24 AG SERPL QL IA: NONREACTIVE
IGA SERPL-MCNC: 273 MG/DL (ref 70–400)
IGG SERPL-MCNC: 1040 MG/DL (ref 700–1600)
IGM SERPL-MCNC: 42 MG/DL (ref 40–230)
IMM GRANULOCYTES # BLD AUTO: 0.01 X10*3/UL (ref 0–0.7)
IMM GRANULOCYTES NFR BLD AUTO: 0.2 % (ref 0–0.9)
IRON SATN MFR SERPL: 41 % (ref 25–45)
IRON SERPL-MCNC: 150 UG/DL (ref 35–150)
LYMPHOCYTES # BLD AUTO: 2.22 X10*3/UL (ref 1.2–4.8)
LYMPHOCYTES NFR BLD AUTO: 39.4 %
MAGNESIUM SERPL-MCNC: 1.98 MG/DL (ref 1.6–2.4)
MCH RBC QN AUTO: 35.2 PG (ref 26–34)
MCHC RBC AUTO-ENTMCNC: 34.3 G/DL (ref 32–36)
MCV RBC AUTO: 103 FL (ref 80–100)
MONOCYTES # BLD AUTO: 0.57 X10*3/UL (ref 0.1–1)
MONOCYTES NFR BLD AUTO: 10.1 %
NEUTROPHILS # BLD AUTO: 2.69 X10*3/UL (ref 1.2–7.7)
NEUTROPHILS NFR BLD AUTO: 47.8 %
NRBC BLD-RTO: 0 /100 WBCS (ref 0–0)
PLATELET # BLD AUTO: 175 X10*3/UL (ref 150–450)
POTASSIUM SERPL-SCNC: 4.1 MMOL/L (ref 3.5–5.3)
PROT SERPL-MCNC: 6.3 G/DL (ref 6.4–8.2)
PROT SERPL-MCNC: 6.5 G/DL (ref 6.4–8.2)
RBC # BLD AUTO: 4.23 X10*6/UL (ref 4.5–5.9)
SODIUM SERPL-SCNC: 137 MMOL/L (ref 136–145)
TIBC SERPL-MCNC: 363 UG/DL (ref 240–445)
TSH SERPL-ACNC: 1.21 MIU/L (ref 0.44–3.98)
UIBC SERPL-MCNC: 213 UG/DL (ref 110–370)
VIT B12 SERPL-MCNC: 825 PG/ML (ref 211–911)
WBC # BLD AUTO: 5.6 X10*3/UL (ref 4.4–11.3)

## 2025-06-09 PROCEDURE — 83521 IG LIGHT CHAINS FREE EACH: CPT

## 2025-06-09 PROCEDURE — 81450 HL NEO GSAP 5-50DNA/DNA&RNA: CPT

## 2025-06-09 PROCEDURE — 3078F DIAST BP <80 MM HG: CPT | Performed by: NURSE PRACTITIONER

## 2025-06-09 PROCEDURE — 85025 COMPLETE CBC W/AUTO DIFF WBC: CPT

## 2025-06-09 PROCEDURE — 36415 COLL VENOUS BLD VENIPUNCTURE: CPT

## 2025-06-09 PROCEDURE — 84155 ASSAY OF PROTEIN SERUM: CPT | Mod: 59

## 2025-06-09 PROCEDURE — 83540 ASSAY OF IRON: CPT

## 2025-06-09 PROCEDURE — 99205 OFFICE O/P NEW HI 60 MIN: CPT | Performed by: NURSE PRACTITIONER

## 2025-06-09 PROCEDURE — 84443 ASSAY THYROID STIM HORMONE: CPT

## 2025-06-09 PROCEDURE — 3074F SYST BP LT 130 MM HG: CPT | Performed by: NURSE PRACTITIONER

## 2025-06-09 PROCEDURE — 86334 IMMUNOFIX E-PHORESIS SERUM: CPT

## 2025-06-09 PROCEDURE — 80053 COMPREHEN METABOLIC PANEL: CPT

## 2025-06-09 PROCEDURE — 87389 HIV-1 AG W/HIV-1&-2 AB AG IA: CPT

## 2025-06-09 PROCEDURE — 82607 VITAMIN B-12: CPT

## 2025-06-09 PROCEDURE — 82784 ASSAY IGA/IGD/IGG/IGM EACH: CPT

## 2025-06-09 PROCEDURE — 83735 ASSAY OF MAGNESIUM: CPT

## 2025-06-09 PROCEDURE — 99215 OFFICE O/P EST HI 40 MIN: CPT | Mod: 25 | Performed by: NURSE PRACTITIONER

## 2025-06-09 PROCEDURE — 82728 ASSAY OF FERRITIN: CPT

## 2025-06-09 ASSESSMENT — PAIN SCALES - GENERAL: PAINLEVEL_OUTOF10: 0-NO PAIN

## 2025-06-09 NOTE — PROGRESS NOTES
Patient ID: Olaf Gage is a 60 y.o. male.  Referring Physician: Abril Rosas MD  [address]  [address]  [place] [address]  Primary Care Provider: Abril Rosas MD  Visit Type: Initial Visit      Subjective    HPI  59yo referred for macrocytosis with labs 3/10/25 WBC 5.5, hgb 16.15, plt 173 with .  He has history of gastric bypass and most recent folate normal with B12 of 1231.  He has not noted abnormal bleeding or bruising.   He has complaints of muscle cramping in his feet for which he takes allopurinol.   He drinks 2-3 glasses of wine per day.  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     Review of Systems - Oncology Asymptomatic    Objective   BSA: 2.26 meters squared  /65   Pulse 66   Temp 35.6 °C (96.1 °F) (Temporal)   Resp 18   Wt 103 kg (226 lb 13.7 oz)   SpO2 98%   BMI 32.55 kg/m²      has a past medical history of Encounter for immunization (03/08/2022), Left lower quadrant pain (09/24/2020), Small intestinal bacterial overgrowth (SIBO) (03/08/2024), and Unspecified abdominal pain (03/04/2020).   has a past surgical history that includes Cardiac electrophysiology procedure (Left, 12/9/2024).  Family History[1]      Olaf Gage  reports that he has been smoking cigarettes and cigars. He has never used smokeless tobacco.  He  reports current alcohol use of about 10.0 standard drinks of alcohol per week.  He  reports no history of drug use.    Physical Exam  Constitutional:       Appearance: Normal appearance.   Eyes:      Conjunctiva/sclera: Conjunctivae normal.   Cardiovascular:      Rate and Rhythm: Normal rate and regular rhythm.      Pulses: Normal pulses.      Heart sounds: Normal heart sounds. No murmur heard.  Pulmonary:      Effort: Pulmonary effort is normal. No respiratory distress.      Breath sounds: Normal breath sounds. No stridor. No wheezing or rhonchi.   Abdominal:      General: There is no distension.      Palpations: There is no mass.      Tenderness:  There is no abdominal tenderness.   Musculoskeletal:         General: No swelling, tenderness or deformity. Normal range of motion.   Lymphadenopathy:      Cervical: No cervical adenopathy.   Skin:     Coloration: Skin is not jaundiced or pale.      Findings: No bruising.   Neurological:      Motor: No weakness.     WBC   Date/Time Value Ref Range Status   03/10/2025 10:26 AM 5.50 4.50 - 10.50 x10*3/uL Final   09/04/2024 10:41 AM 6.9 4.4 - 11.3 x10*3/uL Final   04/18/2023 10:00 AM 6.7 4.4 - 11.3 x10E9/L Final   09/09/2021 09:40 AM 6.1 4.4 - 11.3 x10E9/L Final     nRBC   Date Value Ref Range Status   09/04/2024 0.0 0.0 - 0.0 /100 WBCs Final   04/18/2023 0.0 0.0 - 0.0 /100 WBC Final   09/09/2021 0.0 0.0 - 0.0 /100 WBC Final     RBC   Date Value Ref Range Status   03/10/2025 4.26 4.10 - 5.60 x10*6/uL Final   09/04/2024 4.48 (L) 4.50 - 5.90 x10*6/uL Final   04/18/2023 4.04 (L) 4.50 - 5.90 x10E12/L Final   09/09/2021 4.41 (L) 4.50 - 5.90 x10E12/L Final     Hemoglobin   Date Value Ref Range Status   03/10/2025 16.15 12.70 - 17.00 g/dL Final   09/04/2024 16.0 13.5 - 17.5 g/dL Final   04/18/2023 13.8 13.5 - 17.5 g/dL Final   09/09/2021 15.8 13.5 - 17.5 g/dL Final     Hematocrit   Date Value Ref Range Status   03/10/2025 44.9 38.4 - 51.3 % Final   09/04/2024 46.6 41.0 - 52.0 % Final   04/18/2023 41.8 41.0 - 52.0 % Final   09/09/2021 44.8 41.0 - 52.0 % Final     MCV   Date/Time Value Ref Range Status   03/10/2025 10:26 .5 (H) 80.0 - 96.0 fL Final   09/04/2024 10:41  (H) 80 - 100 fL Final   04/18/2023 10:00  (H) 80 - 100 fL Final   09/09/2021 09:40  (H) 80 - 100 fL Final     MCH   Date/Time Value Ref Range Status   03/10/2025 10:26 AM 37.9 (H) 26.0 - 32.0 pg Final   09/04/2024 10:41 AM 35.7 (H) 26.0 - 34.0 pg Final     MCHC   Date/Time Value Ref Range Status   03/10/2025 10:26 AM 36.0 31.0 - 38.0 g/dL Final   09/04/2024 10:41 AM 34.3 32.0 - 36.0 g/dL Final   04/18/2023 10:00 AM 33.0 32.0 - 36.0 g/dL  Final   09/09/2021 09:40 AM 35.3 32.0 - 36.0 g/dL Final     RDW   Date/Time Value Ref Range Status   03/10/2025 10:26 AM 13.3 11.5 - 14.5 % Final   09/04/2024 10:41 AM 14.0 11.5 - 14.5 % Final   04/18/2023 10:00 AM 13.2 11.5 - 14.5 % Final   09/09/2021 09:40 AM 13.2 11.5 - 14.5 % Final     Platelets   Date/Time Value Ref Range Status   03/10/2025 10:26 .2 150.0 - 450.0 x10*3/uL Final   09/04/2024 10:41  150 - 450 x10*3/uL Final   04/18/2023 10:00  150 - 450 x10E9/L Final   09/09/2021 09:40  150 - 450 x10E9/L Final     MPV   Date/Time Value Ref Range Status   03/10/2025 10:26 AM 8.51 7.80 - 11.00 fL Final     Neutrophils %   Date/Time Value Ref Range Status   03/10/2025 10:26 AM 50.42 42.20 - 75.20 % Final   04/18/2023 10:00 AM 45.3 40.0 - 80.0 % Final   09/09/2021 09:40 AM 42.1 40.0 - 80.0 % Final     Immature Granulocytes %, Automated   Date/Time Value Ref Range Status   04/18/2023 10:00 AM 0.1 0.0 - 0.9 % Final     Comment:      Immature Granulocyte Count (IG) includes promyelocytes,    myelocytes and metamyelocytes but does not include bands.   Percent differential counts (%) should be interpreted in the   context of the absolute cell counts (cells/L).     09/09/2021 09:40 AM 0.3 0.0 - 0.9 % Final     Comment:      Immature Granulocyte Count (IG) includes promyelocytes,    myelocytes and metamyelocytes but does not include bands.   Percent differential counts (%) should be interpreted in the   context of the absolute cell counts (cells/L).       Lymphocytes %   Date/Time Value Ref Range Status   03/10/2025 10:26 AM 39.54 20.50 - 51.10 % Final   04/18/2023 10:00 AM 43.1 13.0 - 44.0 % Final   09/09/2021 09:40 AM 42.3 13.0 - 44.0 % Final     Monocytes %   Date/Time Value Ref Range Status   03/10/2025 10:26 AM 8.27 1.70 - 9.30 % Final   04/18/2023 10:00 AM 8.1 2.0 - 10.0 % Final   09/09/2021 09:40 AM 13.5 2.0 - 10.0 % Final     Eosinophils %   Date/Time Value Ref Range Status   03/10/2025  "10:26 AM 1.44 0.70 - 7.00 % Final   04/18/2023 10:00 AM 3.1 0.0 - 6.0 % Final   09/09/2021 09:40 AM 1.5 0.0 - 6.0 % Final     Basophils %   Date/Time Value Ref Range Status   03/10/2025 10:26 AM 0.33 (H) 0.00 - 0.30 % Final   04/18/2023 10:00 AM 0.3 0.0 - 2.0 % Final   09/09/2021 09:40 AM 0.3 0.0 - 2.0 % Final     Neutrophils Absolute   Date/Time Value Ref Range Status   03/10/2025 10:26 AM 2.77 1.40 - 6.50 x10*3/uL Final   04/18/2023 10:00 AM 3.02 1.20 - 7.70 x10E9/L Final   09/09/2021 09:40 AM 2.56 1.20 - 7.70 x10E9/L Final     No results found for: \"IGABSOL\"  Lymphocytes Absolute   Date/Time Value Ref Range Status   03/10/2025 10:26 AM 2.17 0.00 - 6.00 x10*3/uL Final   04/18/2023 10:00 AM 2.88 1.20 - 4.80 x10E9/L Final   09/09/2021 09:40 AM 2.57 1.20 - 4.80 x10E9/L Final     Monocytes Absolute   Date/Time Value Ref Range Status   03/10/2025 10:26 AM 0.45 (L) 1.60 - 24.90 x10*3/uL Final   04/18/2023 10:00 AM 0.54 0.10 - 1.00 x10E9/L Final   09/09/2021 09:40 AM 0.82 0.10 - 1.00 x10E9/L Final     Eosinophils Absolute   Date/Time Value Ref Range Status   03/10/2025 10:26 AM 0.08 0.04 - 0.50 x10*3/uL Final   04/18/2023 10:00 AM 0.21 0.00 - 0.70 x10E9/L Final   09/09/2021 09:40 AM 0.09 0.00 - 0.70 x10E9/L Final     Basophils Absolute   Date/Time Value Ref Range Status   03/10/2025 10:26 AM 0.02 (L) 0.10 - 1.60 x10*3/uL Final   04/18/2023 10:00 AM 0.02 0.00 - 0.10 x10E9/L Final   09/09/2021 09:40 AM 0.02 0.00 - 0.10 x10E9/L Final       Assessment/Plan    Macrocytosis with history of gastric bypass, will do evaluation and call with results  OV 6 months     Diagnoses and all orders for this visit:  Macrocytosis  -     Referral To Hematology and Oncology  -     CBC and Auto Differential; Future  -     Comprehensive Metabolic Panel; Future  -     Ferritin; Future  -     Iron and TIBC; Future  -     Vitamin B12; Future  -     TSH with reflex to Free T4 if abnormal; Future  -     Myeloid Malignancies Panel; Future  -     " Magnesium; Future  -     Slide Request; Future  -     HIV 1/2 Antigen/Antibody Screen with Reflex to Confirmation; Future  -     Immunoglobulins (IgG, IgA, IgM); Future  -     [place]/Lambda Free Light Chain, Serum; Future  -     Serum Protein Electrophoresis + Immunofixation; Future  -     Clinic Appointment Request Follow up; Future  Gastric bypass status for obesity  -     Ferritin; Future  Anemia in other chronic diseases classified elsewhere  -     Iron and TIBC; Future  Anemia, unspecified type  -     TSH with reflex to Free T4 if abnormal; Future           Azalea Gee PA-C                              [1] No family history on file.

## 2025-06-10 LAB
KAPPA LC SERPL-MCNC: 2.37 MG/DL (ref 0.33–1.94)
KAPPA LC/LAMBDA SER: 1.12 {RATIO} (ref 0.26–1.65)
LAMBDA LC SERPL-MCNC: 2.11 MG/DL (ref 0.57–2.63)
PATH REVIEW-CBC DIFFERENTIAL: NORMAL

## 2025-06-11 LAB
ALBUMIN: 3.7 G/DL (ref 3.4–5)
ALPHA 1 GLOBULIN: 0.2 G/DL (ref 0.2–0.6)
ALPHA 2 GLOBULIN: 0.7 G/DL (ref 0.4–1.1)
BETA GLOBULIN: 0.8 G/DL (ref 0.5–1.2)
GAMMA GLOBULIN: 1 G/DL (ref 0.5–1.4)
IMMUNOFIXATION COMMENT: NORMAL
PATH REVIEW - SERUM IMMUNOFIXATION: NORMAL
PATH REVIEW-SERUM PROTEIN ELECTROPHORESIS: NORMAL
PROTEIN ELECTROPHORESIS COMMENT: NORMAL

## 2025-06-13 LAB
ELECTRONICALLY SIGNED BY: NORMAL
MYELOID NGS RESULTS: NORMAL

## 2025-06-23 DIAGNOSIS — E79.0 HYPERURICEMIA: ICD-10-CM

## 2025-06-23 RX ORDER — ALLOPURINOL 300 MG/1
300 TABLET ORAL DAILY
Qty: 90 TABLET | Refills: 1 | Status: SHIPPED | OUTPATIENT
Start: 2025-06-23

## 2025-07-21 ENCOUNTER — HOSPITAL ENCOUNTER (OUTPATIENT)
Dept: CARDIOLOGY | Facility: CLINIC | Age: 60
Discharge: HOME | End: 2025-07-21
Payer: MEDICARE

## 2025-07-21 DIAGNOSIS — Z95.810 CARDIOMYOPATHY WITH IMPLANTABLE CARDIOVERTER-DEFIBRILLATOR: ICD-10-CM

## 2025-07-21 DIAGNOSIS — Z95.810 PRESENCE OF AUTOMATIC (IMPLANTABLE) CARDIAC DEFIBRILLATOR: ICD-10-CM

## 2025-07-21 DIAGNOSIS — I42.9 CARDIOMYOPATHY WITH IMPLANTABLE CARDIOVERTER-DEFIBRILLATOR: ICD-10-CM

## 2025-07-21 DIAGNOSIS — I50.20 SYSTOLIC CONGESTIVE HEART FAILURE, UNSPECIFIED HF CHRONICITY: ICD-10-CM

## 2025-07-21 PROCEDURE — 93295 DEV INTERROG REMOTE 1/2/MLT: CPT | Performed by: INTERNAL MEDICINE

## 2025-07-21 PROCEDURE — 93296 REM INTERROG EVL PM/IDS: CPT

## 2025-08-05 ENCOUNTER — TELEPHONE (OUTPATIENT)
Dept: PRIMARY CARE | Facility: CLINIC | Age: 60
End: 2025-08-05
Payer: MEDICARE

## 2025-08-05 NOTE — TELEPHONE ENCOUNTER
Dr. Alison Horta RN [place] case management left a voicemail message stating the member completed an annual health risk assessment it's also in the provider portal if with questions or need assistance call the help line at [phone]. If you need to contact Mychal her contact number is [phone] office hours ar Monday - Friday 8:00 am- 5:00 Pm.

## 2025-09-12 ENCOUNTER — APPOINTMENT (OUTPATIENT)
Dept: PRIMARY CARE | Facility: CLINIC | Age: 60
End: 2025-09-12
Payer: MEDICARE

## (undated) DEVICE — Device

## (undated) DEVICE — PAD, ELECTRODE DEFIB PADPRO ADULT STRL W/ADAPTER